# Patient Record
Sex: MALE | Race: WHITE | URBAN - METROPOLITAN AREA
[De-identification: names, ages, dates, MRNs, and addresses within clinical notes are randomized per-mention and may not be internally consistent; named-entity substitution may affect disease eponyms.]

---

## 2017-03-13 ENCOUNTER — OFFICE VISIT (OUTPATIENT)
Dept: FAMILY MEDICINE CLINIC | Age: 27
End: 2017-03-13

## 2017-03-13 VITALS
BODY MASS INDEX: 25.34 KG/M2 | HEART RATE: 62 BPM | HEIGHT: 70 IN | TEMPERATURE: 98 F | DIASTOLIC BLOOD PRESSURE: 70 MMHG | WEIGHT: 177 LBS | SYSTOLIC BLOOD PRESSURE: 124 MMHG | OXYGEN SATURATION: 97 %

## 2017-03-13 DIAGNOSIS — Z00.00 ANNUAL PHYSICAL EXAM: Primary | ICD-10-CM

## 2017-03-13 LAB
BILIRUBIN, POC: NORMAL
BLOOD URINE, POC: NORMAL
CLARITY, POC: CLEAR
COLOR, POC: YELLOW
GLUCOSE URINE, POC: NORMAL
KETONES, POC: NORMAL
LEUKOCYTE EST, POC: NORMAL
NITRITE, POC: NORMAL
PH, POC: 7.5
PROTEIN, POC: NORMAL
SPECIFIC GRAVITY, POC: 1
UROBILINOGEN, POC: 0.2

## 2017-03-13 PROCEDURE — 99395 PREV VISIT EST AGE 18-39: CPT | Performed by: NURSE PRACTITIONER

## 2017-03-13 PROCEDURE — 90746 HEPB VACCINE 3 DOSE ADULT IM: CPT | Performed by: NURSE PRACTITIONER

## 2017-03-13 PROCEDURE — 81002 URINALYSIS NONAUTO W/O SCOPE: CPT | Performed by: NURSE PRACTITIONER

## 2017-03-13 PROCEDURE — 90471 IMMUNIZATION ADMIN: CPT | Performed by: NURSE PRACTITIONER

## 2017-03-14 PROBLEM — Z00.00 ANNUAL PHYSICAL EXAM: Status: ACTIVE | Noted: 2017-03-14

## 2017-04-17 DIAGNOSIS — Z00.00 ANNUAL PHYSICAL EXAM: ICD-10-CM

## 2017-04-17 LAB
CHOLESTEROL, TOTAL: 139 MG/DL (ref 0–199)
GLUCOSE BLD-MCNC: 102 MG/DL (ref 70–99)
HDLC SERPL-MCNC: 48 MG/DL (ref 40–60)
LDL CHOLESTEROL CALCULATED: 76 MG/DL
TRIGL SERPL-MCNC: 74 MG/DL (ref 0–150)
VLDLC SERPL CALC-MCNC: 15 MG/DL

## 2018-09-26 PROBLEM — Z00.00 ANNUAL PHYSICAL EXAM: Status: RESOLVED | Noted: 2017-03-14 | Resolved: 2018-09-26

## 2022-03-03 NOTE — PROGRESS NOTES
3/4/2022    Autumn Shah (:  1990) is a 36 y.o. male, here for evaluation of the following medical concerns:    HPI    Well Adult Physical: Patient here for a comprehensive physical exam.The patient reports problems -inattentiveness, concern for ADD  Do you take any herbs or supplements that were not prescribed by a doctor? no Are you taking calcium supplements? not applicable Are you taking aspirin daily? not applicable    Sexual activity: has sex with females; condoms  Diet: Healthy  Exercise: weight training  4 time(s) per week  Seatbelt use: yes    Review of Systems   Constitutional: Negative for activity change, fatigue and unexpected weight change. HENT: Negative for hearing loss. Eyes: Negative for visual disturbance. Respiratory: Negative for cough and shortness of breath. Cardiovascular: Negative for chest pain and leg swelling. Gastrointestinal: Negative for blood in stool, nausea and vomiting. Endocrine: Negative for cold intolerance, heat intolerance, polydipsia and polyphagia. Genitourinary: Negative for dysuria, frequency, penile discharge, penile pain, scrotal swelling and testicular pain. Musculoskeletal: Negative for arthralgias. Skin: Negative for rash. Allergic/Immunologic: Negative for environmental allergies. Neurological: Negative for dizziness, seizures, light-headedness and headaches. Hematological: Does not bruise/bleed easily. Psychiatric/Behavioral: Positive for decreased concentration. Negative for agitation, dysphoric mood, self-injury, sleep disturbance and suicidal ideas. The patient is not nervous/anxious. Prior to Visit Medications    Not on File        No Known Allergies    History reviewed. No pertinent past medical history.     Past Surgical History:   Procedure Laterality Date    KNEE SURGERY         Social History     Socioeconomic History    Marital status: Single     Spouse name: Not on file    Number of children: Not on file  Years of education: Not on file    Highest education level: Not on file   Occupational History    Not on file   Tobacco Use    Smoking status: Current Every Day Smoker    Smokeless tobacco: Never Used    Tobacco comment: social    Substance and Sexual Activity    Alcohol use: Yes     Comment: drinks once a week    Drug use: No    Sexual activity: Yes     Partners: Female     Birth control/protection: Condom   Other Topics Concern    Not on file   Social History Narrative    Not on file     Social Determinants of Health     Financial Resource Strain:     Difficulty of Paying Living Expenses: Not on file   Food Insecurity:     Worried About Running Out of Food in the Last Year: Not on file    Felix of Food in the Last Year: Not on file   Transportation Needs:     Lack of Transportation (Medical): Not on file    Lack of Transportation (Non-Medical): Not on file   Physical Activity:     Days of Exercise per Week: Not on file    Minutes of Exercise per Session: Not on file   Stress:     Feeling of Stress : Not on file   Social Connections:     Frequency of Communication with Friends and Family: Not on file    Frequency of Social Gatherings with Friends and Family: Not on file    Attends Hoahaoism Services: Not on file    Active Member of [address] [place] [address] or Organizations: Not on file    Attends Club or Organization Meetings: Not on file    Marital Status: Not on file   Intimate Partner Violence:     Fear of Current or Ex-Partner: Not on file    Emotionally Abused: Not on file    Physically Abused: Not on file    Sexually Abused: Not on file   Housing Stability:     Unable to Pay for Housing in the Last Year: Not on file    Number of [place] in the Last Year: Not on file    Unstable Housing in the Last Year: Not on file        History reviewed. No pertinent family history.     Vitals:    03/04/22 0843   BP: 125/71   Pulse: 57   Resp: 16   Temp: 98.4 °F (36.9 °C)   TempSrc: Temporal   Weight: 181 lb 6.4 oz (82.3 kg)   Height: 5' 10\" (1.778 m)     Estimated body mass index is 26.03 kg/m² as calculated from the following:    Height as of this encounter: 5' 10\" (1.778 m). Weight as of this encounter: 181 lb 6.4 oz (82.3 kg). Physical Exam  Vitals reviewed. Constitutional:       Appearance: Normal appearance. He is normal weight. HENT:      Head: Normocephalic and atraumatic. Right Ear: Tympanic membrane, ear canal and external ear normal.      Left Ear: Tympanic membrane, ear canal and external ear normal.      Nose: Nose normal.   Eyes:      Extraocular Movements: Extraocular movements intact. Conjunctiva/sclera: Conjunctivae normal.   Cardiovascular:      Rate and Rhythm: Normal rate and regular rhythm. Pulses: Normal pulses. Heart sounds: Normal heart sounds. Pulmonary:      Effort: Pulmonary effort is normal.      Breath sounds: Normal breath sounds. Abdominal:      General: Abdomen is flat. Bowel sounds are normal.      Palpations: Abdomen is soft. Musculoskeletal:         General: Normal range of motion. Cervical back: Normal range of motion and neck supple. Skin:     General: Skin is warm and dry. Capillary Refill: Capillary refill takes less than 2 seconds. Findings: No rash. Neurological:      General: No focal deficit present. Mental Status: He is alert and oriented to person, place, and time. Mental status is at baseline. Psychiatric:         Mood and Affect: Mood normal.         Behavior: Behavior normal.         Thought Content:  Thought content normal.         Judgment: Judgment normal.         Separate Identifiable issues addressed today:  ADD/ADHD Symptoms:  Patient complains of a several month(s) history of inattention, impulsivity, academic underachievement, including the following: fails to give close attention to details or makes careless mistakes in school, work, or other activities, has difficulty sustaining attention in tasks or play activities, does not seem to listen when spoken to directly, has difficulty organizing tasks and activities, does not follow through on instructions and fails to finish schoolwork, chores, or duties in the workplace, is easily distracted by extraneous stimuli, is often forgetful in daily activities and avoids engaging in tasks that require sustained attention. He presents for evaluation of suspected ADD/ADHD. Patient denies hyperactivity, behavior problems, depressed mood, anhedonia, feelings of hopelessness, anxiety. Symptoms have been rapidly worsening with time. Family history of ADD/ADHD: no.  Previous treatment:has included: none. ASSESSMENT/PLAN:  Gustavo Chase was seen today for establish care. Diagnoses and all orders for this visit:    Encounter to establish care with new doctor: Vitals reviewed within normal limits. BMI nonobese. Reviewed diet exercise regimen patient regimen appropriate lifestyle modifications. -     Comprehensive Metabolic Panel; Future    Episodes of decreased attentiveness: No prior history of ADD. Works as a  and as his career is gotten more than banding he has noticed the symptoms above. Would like referral for evaluation of ADD. Referral placed. -     Ambulatory referral to Psychiatry    Encounter for hepatitis C screening test for low risk patient  -     Hepatitis C Antibody; Future    Screening for HIV (human immunodeficiency virus)  -     HIV Screen; Future    Screening for hyperlipidemia  -     Lipid, Fasting; Future        Return in about 3 months (around 6/4/2022) for ADD/ADHD. An  electronic signature was used to authenticate this note.     --Laurie De Leon, DO on 3/4/2022 at 9:06 AM

## 2022-03-03 NOTE — PATIENT INSTRUCTIONS
Patient Education        Well Visit, Ages 25 to 48: Care Instructions  Overview     Well visits can help you stay healthy. Your doctor has checked your overall health and may have suggested ways to take good care of yourself. Your doctor also may have recommended tests. At home, you can help prevent illness with healthy eating, regular exercise, and other steps. Follow-up care is a key part of your treatment and safety. Be sure to make and go to all appointments, and call your doctor if you are having problems. It's also a good idea to know your test results and keep a list of the medicines you take. How can you care for yourself at home? · Get screening tests that you and your doctor decide on. Screening helps find diseases before any symptoms appear. · Eat healthy foods. Choose fruits, vegetables, whole grains, protein, and low-fat dairy foods. Limit fat, especially saturated fat. Reduce salt in your diet. · Limit alcohol. If you are a man, have no more than 2 drinks a day or 14 drinks a week. If you are a woman, have no more than 1 drink a day or 7 drinks a week. · Get at least 30 minutes of physical activity on most days of the week. Walking is a good choice. You also may want to do other activities, such as running, swimming, cycling, or playing tennis or team sports. Discuss any changes in your exercise program with your doctor. · Reach and stay at a healthy weight. This will lower your risk for many problems, such as obesity, diabetes, heart disease, and high blood pressure. · Do not smoke or allow others to smoke around you. If you need help quitting, talk to your doctor about stop-smoking programs and medicines. These can increase your chances of quitting for good. · Care for your mental health. It is easy to get weighed down by worry and stress. Learn strategies to manage stress, like deep breathing and mindfulness, and stay connected with your family and community.  If you find you often feel sad or hopeless, talk with your doctor. Treatment can help. · Talk to your doctor about whether you have any risk factors for sexually transmitted infections (STIs). You can help prevent STIs if you wait to have sex with a new partner (or partners) until you've each been tested for STIs. It also helps if you use condoms (male or female condoms) and if you limit your sex partners to one person who only has sex with you. Vaccines are available for some STIs, such as HPV. · Use birth control if it's important to you to prevent pregnancy. Talk with your doctor about the choices available and what might be best for you. · If you think you may have a problem with alcohol or drug use, talk to your doctor. This includes prescription medicines (such as amphetamines and opioids) and illegal drugs (such as cocaine and methamphetamine). Your doctor can help you figure out what type of treatment is best for you. · Protect your skin from too much sun. When you're outdoors from 10 a.m. to 4 p.m., stay in the shade or cover up with clothing and a hat with a wide brim. Wear sunglasses that block UV rays. Even when it's cloudy, put broad-spectrum sunscreen (SPF 30 or higher) on any exposed skin. · See a dentist one or two times a year for checkups and to have your teeth cleaned. · Wear a seat belt in the car. When should you call for help? Watch closely for changes in your health, and be sure to contact your doctor if you have any problems or symptoms that concern you. Where can you learn more? Go to [email or web address] org and sign in to your [email or web address] account. Enter P072 in the [employer] box to learn more about \"Well Visit, Ages 25 to 48: Care Instructions. \"     If you do not have an account, please click on the \"Sign Up Now\" link. Current as of: October 6, 2021               Content Version: 13.1  © 6105-4130 Healthwise, Incorporated. Care instructions adapted under license by [facility] ([facility]). If you have questions about a medical condition or this instruction, always ask your healthcare professional. [place] [address] any warranty or liability for your use of this information.

## 2022-03-04 ENCOUNTER — OFFICE VISIT (OUTPATIENT)
Dept: PRIMARY CARE CLINIC | Age: 32
End: 2022-03-04

## 2022-03-04 VITALS
RESPIRATION RATE: 16 BRPM | BODY MASS INDEX: 25.97 KG/M2 | HEART RATE: 57 BPM | HEIGHT: 70 IN | WEIGHT: 181.4 LBS | DIASTOLIC BLOOD PRESSURE: 71 MMHG | TEMPERATURE: 98.4 F | SYSTOLIC BLOOD PRESSURE: 125 MMHG

## 2022-03-04 DIAGNOSIS — Z76.89 ENCOUNTER TO ESTABLISH CARE WITH NEW DOCTOR: ICD-10-CM

## 2022-03-04 DIAGNOSIS — Z11.59 ENCOUNTER FOR HEPATITIS C SCREENING TEST FOR LOW RISK PATIENT: ICD-10-CM

## 2022-03-04 DIAGNOSIS — Z13.220 SCREENING FOR HYPERLIPIDEMIA: ICD-10-CM

## 2022-03-04 DIAGNOSIS — Z76.89 ENCOUNTER TO ESTABLISH CARE WITH NEW DOCTOR: Primary | ICD-10-CM

## 2022-03-04 DIAGNOSIS — R68.89 EPISODES OF DECREASED ATTENTIVENESS: ICD-10-CM

## 2022-03-04 DIAGNOSIS — Z11.4 SCREENING FOR HIV (HUMAN IMMUNODEFICIENCY VIRUS): ICD-10-CM

## 2022-03-04 LAB
A/G RATIO: 2.2 (ref 1.1–2.2)
ALBUMIN SERPL-MCNC: 4.9 G/DL (ref 3.4–5)
ALP BLD-CCNC: 53 U/L (ref 40–129)
ALT SERPL-CCNC: 30 U/L (ref 10–40)
ANION GAP SERPL CALCULATED.3IONS-SCNC: 13 MMOL/L (ref 3–16)
AST SERPL-CCNC: 18 U/L (ref 15–37)
BILIRUB SERPL-MCNC: 0.5 MG/DL (ref 0–1)
BUN BLDV-MCNC: 14 MG/DL (ref 7–20)
CALCIUM SERPL-MCNC: 9.9 MG/DL (ref 8.3–10.6)
CHLORIDE BLD-SCNC: 102 MMOL/L (ref 99–110)
CHOLESTEROL, FASTING: 146 MG/DL (ref 0–199)
CO2: 25 MMOL/L (ref 21–32)
CREAT SERPL-MCNC: 0.9 MG/DL (ref 0.9–1.3)
GFR AFRICAN AMERICAN: >60
GFR NON-AFRICAN AMERICAN: >60
GLUCOSE BLD-MCNC: 91 MG/DL (ref 70–99)
HDLC SERPL-MCNC: 51 MG/DL (ref 40–60)
HEPATITIS C ANTIBODY INTERPRETATION: NORMAL
HIV AG/AB: NORMAL
HIV ANTIGEN: NORMAL
HIV-1 ANTIBODY: NORMAL
HIV-2 AB: NORMAL
LDL CHOLESTEROL CALCULATED: 83 MG/DL
POTASSIUM SERPL-SCNC: 5 MMOL/L (ref 3.5–5.1)
SODIUM BLD-SCNC: 140 MMOL/L (ref 136–145)
TOTAL PROTEIN: 7.1 G/DL (ref 6.4–8.2)
TRIGLYCERIDE, FASTING: 59 MG/DL (ref 0–150)
VLDLC SERPL CALC-MCNC: 12 MG/DL

## 2022-03-04 PROCEDURE — 99204 OFFICE O/P NEW MOD 45 MIN: CPT | Performed by: STUDENT IN AN ORGANIZED HEALTH CARE EDUCATION/TRAINING PROGRAM

## 2022-03-04 PROCEDURE — 99385 PREV VISIT NEW AGE 18-39: CPT | Performed by: STUDENT IN AN ORGANIZED HEALTH CARE EDUCATION/TRAINING PROGRAM

## 2022-03-04 ASSESSMENT — PATIENT HEALTH QUESTIONNAIRE - PHQ9
SUM OF ALL RESPONSES TO PHQ QUESTIONS 1-9: 0
2. FEELING DOWN, DEPRESSED OR HOPELESS: 0
SUM OF ALL RESPONSES TO PHQ9 QUESTIONS 1 & 2: 0
1. LITTLE INTEREST OR PLEASURE IN DOING THINGS: 0
SUM OF ALL RESPONSES TO PHQ QUESTIONS 1-9: 0

## 2022-03-04 ASSESSMENT — ENCOUNTER SYMPTOMS
SHORTNESS OF BREATH: 0
NAUSEA: 0
VOMITING: 0
BLOOD IN STOOL: 0
COUGH: 0

## 2022-03-07 ENCOUNTER — PATIENT MESSAGE (OUTPATIENT)
Dept: PRIMARY CARE CLINIC | Age: 32
End: 2022-03-07

## 2022-03-07 RX ORDER — KETOTIFEN FUMARATE 0.35 MG/ML
1 SOLUTION/ DROPS OPHTHALMIC 2 TIMES DAILY
Qty: 10 ML | Refills: 2 | Status: SHIPPED | OUTPATIENT
Start: 2022-03-07 | End: 2022-03-17

## 2022-04-15 ENCOUNTER — OFFICE VISIT (OUTPATIENT)
Dept: PSYCHIATRY | Age: 32
End: 2022-04-15

## 2022-04-15 DIAGNOSIS — F90.0 ADHD, PREDOMINANTLY INATTENTIVE TYPE: Primary | ICD-10-CM

## 2022-04-15 PROCEDURE — 99204 OFFICE O/P NEW MOD 45 MIN: CPT | Performed by: STUDENT IN AN ORGANIZED HEALTH CARE EDUCATION/TRAINING PROGRAM

## 2022-04-15 RX ORDER — DEXTROAMPHETAMINE SACCHARATE, AMPHETAMINE ASPARTATE MONOHYDRATE, DEXTROAMPHETAMINE SULFATE AND AMPHETAMINE SULFATE 1.25; 1.25; 1.25; 1.25 MG/1; MG/1; MG/1; MG/1
5 CAPSULE, EXTENDED RELEASE ORAL DAILY
Qty: 30 CAPSULE | Refills: 0 | Status: SHIPPED | OUTPATIENT
Start: 2022-04-15 | End: 2022-05-23 | Stop reason: SDUPTHER

## 2022-04-15 ASSESSMENT — PATIENT HEALTH QUESTIONNAIRE - PHQ9
SUM OF ALL RESPONSES TO PHQ QUESTIONS 1-9: 0
SUM OF ALL RESPONSES TO PHQ9 QUESTIONS 1 & 2: 0
SUM OF ALL RESPONSES TO PHQ QUESTIONS 1-9: 0
SUM OF ALL RESPONSES TO PHQ QUESTIONS 1-9: 0
2. FEELING DOWN, DEPRESSED OR HOPELESS: 0
4. FEELING TIRED OR HAVING LITTLE ENERGY: 0
7. TROUBLE CONCENTRATING ON THINGS, SUCH AS READING THE NEWSPAPER OR WATCHING TELEVISION: 0
6. FEELING BAD ABOUT YOURSELF - OR THAT YOU ARE A FAILURE OR HAVE LET YOURSELF OR YOUR FAMILY DOWN: 0
8. MOVING OR SPEAKING SO SLOWLY THAT OTHER PEOPLE COULD HAVE NOTICED. OR THE OPPOSITE, BEING SO FIGETY OR RESTLESS THAT YOU HAVE BEEN MOVING AROUND A LOT MORE THAN USUAL: 0
5. POOR APPETITE OR OVEREATING: 0
SUM OF ALL RESPONSES TO PHQ QUESTIONS 1-9: 0
9. THOUGHTS THAT YOU WOULD BE BETTER OFF DEAD, OR OF HURTING YOURSELF: 0
1. LITTLE INTEREST OR PLEASURE IN DOING THINGS: 0
3. TROUBLE FALLING OR STAYING ASLEEP: 0
10. IF YOU CHECKED OFF ANY PROBLEMS, HOW DIFFICULT HAVE THESE PROBLEMS MADE IT FOR YOU TO DO YOUR WORK, TAKE CARE OF THINGS AT HOME, OR GET ALONG WITH OTHER PEOPLE: 0

## 2022-04-15 ASSESSMENT — ANXIETY QUESTIONNAIRES
2. NOT BEING ABLE TO STOP OR CONTROL WORRYING: 0
3. WORRYING TOO MUCH ABOUT DIFFERENT THINGS: 0
5. BEING SO RESTLESS THAT IT IS HARD TO SIT STILL: 0
4. TROUBLE RELAXING: 0
6. BECOMING EASILY ANNOYED OR IRRITABLE: 0
GAD7 TOTAL SCORE: 0
1. FEELING NERVOUS, ANXIOUS, OR ON EDGE: 0
7. FEELING AFRAID AS IF SOMETHING AWFUL MIGHT HAPPEN: 0
IF YOU CHECKED OFF ANY PROBLEMS ON THIS QUESTIONNAIRE, HOW DIFFICULT HAVE THESE PROBLEMS MADE IT FOR YOU TO DO YOUR WORK, TAKE CARE OF THINGS AT HOME, OR GET ALONG WITH OTHER PEOPLE: NOT DIFFICULT AT ALL

## 2022-04-15 NOTE — PROGRESS NOTES
PSYCHIATRY INITIAL EVALUATION    Chyna Willard  1990  04/15/22  Face to Face time: 60 minutes  PCP: Elia Wilkes DO    CC: ADHD      ASSESSMENT:   Patient is a 65-year-old male without significant past medical history presents to the outpatient psychiatric clinic today for evaluation management of ADHD. Patient presentation today is consistent with prominent difficulties stemming from a diagnosis of ADHD, inattentive subtype predominant (though meets criteria for combined type). While the patient has always been successful and gotten good grades, this is likely due to a high degree of innate intelligence that has allowed him to cope well with his underlying difficulties. In his current situation, the patient is starting to experience difficulties across multiple realms and seeing his coping skills being inadequate for his needs. We will attempt to address this with medication management. Patient denies significant symptoms in depression or anxiety, though these are not uncommon in the setting of difficulties with ADHD. We will continue to monitor these as we go forward with treatment. Diagnoses:  ADHD, inattentive subtype predominant    PLAN:   1. Discussed with patient potential management options further conditions including medication management as well as nonpharmacologic strategies. Patient on board with current plan of care. 2.  We will attempt to initiate treatment for ADHD with Adderall XR 5 mg daily. Patient was cautioned regarding adverse effects of this medication including appetite suppression, headaches, increases to heart rate/blood pressure, and yamileth/psychosis.       Medication Monitoring:    - PDMP reviewed: No matching criteria      Follow-up: RTC in 4 weeks    Safety: Pt was counseled on the potential for increased suicidal ideations and advised on potential options for dealing with these including hotlines, calling the office, or going to the nearest emergency room.    ____________________________________________________________________________    HPI:   Patient is a 28-year-old male without significant past medical history presents today to the outpatient psychiatric clinic today for evaluation and management of ADHD. Patient indicated that he has felt that he has had ADHD for a very long time but has always been able to deal with it. He acknowledged current difficulties with losing track of conversations, time management issues, and prominent decrease to focus. Patient acknowledged that he will often lose sleep secondary to doing tasks at the last minute. He recognizes that this has been a continuing pattern for him, that he was the class clown in school because he \"could not stop talking\". Patient feels that his behavior is holding him back from significant advancement in his field, as he has been criticized for \"not listening to the concerns/advice of others\". Given this, an ADHD screening was conducted which revealed the followin. Inattention: Five or more for adolescents 16 and older and adults; symptoms of inattention have been present for at least 6 months, and they are inappropriate for developmental level (positives in bold):    Often fails to give close attention to details or makes careless mistakes in schoolwork, at work, or with other activities.  Often has trouble holding attention on tasks or play activities.  Often does not seem to listen when spoken to directly.  Often does not follow through on instructions and fails to finish schoolwork, chores, or duties in the workplace (e.g., loses focus, side-tracked).  Often has trouble organizing tasks and activities.  Often avoids, dislikes, or is reluctant to do tasks that require mental effort over a long period of time (such as schoolwork or homework).     Often loses things necessary for tasks and activities (e.g. school materials, pencils, books, tools, wallets, keys, paperwork, eyeglasses, mobile telephones).  Is often easily distracted    Is often forgetful in daily activities. 2. Hyperactivity and Impulsivity: Five or more for adolescents 17 and older and adults; symptoms of hyperactivity-impulsivity have been present for at least 6 months to an extent that is disruptive and inappropriate for the persons developmental level (positives in bold):    Often fidgets with or taps hands or feet, or squirms in seat.  Often leaves seat in situations when remaining seated is expected.  Often runs about or climbs in situations where it is not appropriate (adolescents or adults may be limited to feeling restless).  Often unable to play or take part in leisure activities quietly.  Is often \"on the go\" acting as if \"driven by a motor\".  Often talks excessively.  Often blurts out an answer before a question has been completed.  Often has trouble waiting his/her turn.  Often interrupts or intrudes on others (e.g., butts into conversations or games)    In addition, patient endorses (positives in bold):  Several inattentive or hyperactive-impulsive symptoms were present before age 15 years.  Several symptoms are present in two or more setting, (e.g., at home, school or work; with friends or relatives; in other activities).  There is clear evidence that the symptoms interfere with, or reduce the quality of, social, school, or work functioning. On depression screening, patient identified achieving approximately 6 hours of sleep on average, though he noted decreased sleep at times when he has projects to do at the last minute. He notes that his appetite is okay, that he achieves good exercise. He notes his motivation and energy are generally okay, except that his motivation is only usually there to get tasks done when the deadline approaches. He acknowledged prominent decreased focus as described previously.   He denies significant depressive moods, denies SI/HI. Patient screened negative for yamileth/psychosis. He denied significant symptoms of anxiety other than the fact that he gets an anxious sensation when tasks begin to build up. Patient denied a history consistent with panic or trauma. ROS:   Review of Systems   Constitutional: Negative. HENT: Negative. Eyes: Negative. Respiratory: Negative. Cardiovascular: Negative. Gastrointestinal: Negative. Endocrine: Negative. Genitourinary: Negative. Musculoskeletal: Negative. Skin: Negative. Allergic/Immunologic: Negative. Neurological: Negative. Hematological: Negative. Psychiatric/Behavioral: Positive for behavioral problems and decreased concentration. Past Psychiatric History:     Hosp: Denied  Diagnoses: None prior  Currrent medications: No current medications  Med trials: Denied  Outpt: None prior  NSSI: Denied  Suicide Attempts: Denied    No past medical history on file. Past Surgical History:   Procedure Laterality Date    KNEE SURGERY       Social History     Socioeconomic History    Marital status: Single     Spouse name: None    Number of children: None    Years of education: None    Highest education level: Bachelor's degree (e.g., BA, AB, BS)   Occupational History    Occupation: [employer]    Tobacco Use    Smoking status: Current Every Day Smoker    Smokeless tobacco: Never Used    Tobacco comment: social    Substance and Sexual Activity    Alcohol use: Yes     Comment: drinks once a week    Drug use: Yes     Types: Marijuana (Weed)     Comment: Recreational 2-3 times per week    Sexual activity: Yes     Partners: Female     Birth control/protection: Condom   Other Topics Concern    None   Social History Narrative    Patient acknowledges that he and his girlfriend just recently moved in together, noting that this has increased some stress in his life.   They have been together for slightly under 9 months at this time. They have 3 dogs together. Patient acknowledges that he is always gotten good grades throughout school, though he was noted to be a \"class clown\". He graduated with a degree in biomedical engineering. No guns in the home, no  service for the patient, no legal issues at this time. Social Determinants of Health     Financial Resource Strain:     Difficulty of Paying Living Expenses: Not on file   Food Insecurity:     Worried About Running Out of Food in the Last Year: Not on file    Felix of Food in the Last Year: Not on file   Transportation Needs:     Lack of Transportation (Medical): Not on file    Lack of Transportation (Non-Medical): Not on file   Physical Activity:     Days of Exercise per Week: Not on file    Minutes of Exercise per Session: Not on file   Stress:     Feeling of Stress : Not on file   Social Connections:     Frequency of Communication with Friends and Family: Not on file    Frequency of Social Gatherings with Friends and Family: Not on file    Attends Pentecostal Services: Not on file    Active Member of [address] [place] [address] [employer] or Organizations: Not on file    Attends Club or Organization Meetings: Not on file    Marital Status: Not on file   Intimate Partner Violence:     Fear of Current or Ex-Partner: Not on file    Emotionally Abused: Not on file    Physically Abused: Not on file    Sexually Abused: Not on file   Housing Stability:     Unable to Pay for Housing in the Last Year: Not on file    Number of [place] in the Last Year: Not on file    Unstable Housing in the Last Year: Not on file      No family history on file. No Known Allergies  No current outpatient medications on file prior to visit. No current facility-administered medications on file prior to visit. OBJECTIVE:  Vitals:    04/15/22 1404   BP: 130/78   Pulse: 69   Resp: 12   Weight: 180 lb 9.6 oz (81.9 kg)       MSE:   Appearance:    Appropriately dressed  Motor: Fidgeting noted.   Eye Contact: Appropriate  Speech:    Appropriate rate and rhythm  Language:   Appropriate diction  Mood/Affect:   \"My mood is okay\"/euthymic  Thought Process: Tangential multiple times with episodic linear/logical thought  Thought Content:    Topicappropriate, no SI/HI  Hallucinations:   Denied, not seem to be responding to internal stimuli  Associations:   Intact  Attention/Concentration:   Inattention errors noted throughout conversation  Orientation:    Alert and oriented x4  Memory:   Intact  Fund of Knowledge:    Appropriate for age and education  Insight/Judgement:   Intact/intact    MARTHA-7 SCREENING 4/15/2022   Feeling nervous, anxious, or on edge Not at all   Not being able to stop or control worrying Not at all   Worrying too much about different things Not at all   Trouble relaxing Not at all   Being so restless that it is hard to sit still Not at all   Becoming easily annoyed or irritable Not at all   Feeling afraid as if something awful might happen Not at all   MARTHA-7 Total Score 0   How difficult have these problems made it for you to do your work, take care of things at home, or get along with other people? Not difficult at all     PHQ-9 Questionaire 4/15/2022 3/4/2022   Little interest or pleasure in doing things 0 0   Feeling down, depressed, or hopeless 0 0   Trouble falling or staying asleep, or sleeping too much 0 -   Feeling tired or having little energy 0 -   Poor appetite or overeating 0 -   Feeling bad about yourself - or that you are a failure or have let yourself or your family down 0 -   Trouble concentrating on things, such as reading the newspaper or watching television 0 -   Moving or speaking so slowly that other people could have noticed.  Or the opposite - being so fidgety or restless that you have been moving around a lot more than usual 0 -   Thoughts that you would be better off dead, or of hurting yourself in some way 0 -   PHQ-9 Total Score 0 0   If you checked off any problems, how difficult have these problems made it for you to do your work, take care of things at home, or get along with other people?  0 -        Labs:     Lab Results   Component Value Date    CHOL 139 04/17/2017     Lab Results   Component Value Date    TRIG 74 04/17/2017     Lab Results   Component Value Date    HDL 51 03/04/2022    HDL 48 04/17/2017     Lab Results   Component Value Date    LDLCALC 83 03/04/2022    LDLCALC 76 04/17/2017     Lab Results   Component Value Date    LABVLDL 12 03/04/2022    LABVLDL 15 04/17/2017     TSH: None on file    Last Drug screen: None on file    Imaging: No pertinent imaging    EKG: None on file        Olivia Ann MD   Psychiatry

## 2022-05-01 VITALS
RESPIRATION RATE: 12 BRPM | HEART RATE: 69 BPM | DIASTOLIC BLOOD PRESSURE: 78 MMHG | BODY MASS INDEX: 25.91 KG/M2 | WEIGHT: 180.6 LBS | SYSTOLIC BLOOD PRESSURE: 130 MMHG

## 2022-05-03 ASSESSMENT — ENCOUNTER SYMPTOMS
ALLERGIC/IMMUNOLOGIC NEGATIVE: 1
GASTROINTESTINAL NEGATIVE: 1
RESPIRATORY NEGATIVE: 1
EYES NEGATIVE: 1

## 2022-06-10 PROBLEM — F90.0 ATTENTION DEFICIT HYPERACTIVITY DISORDER (ADHD), PREDOMINANTLY INATTENTIVE TYPE: Status: ACTIVE | Noted: 2022-06-10

## 2022-12-27 NOTE — PROGRESS NOTES
PSYCHIATRY PROGRESS NOTE    Jose Minors  1990 12/30/22  Face to Face time: 30 minutes  PCP: Lauren Montgomery DO    CC:   Chief Complaint   Patient presents with    ADHD     Patient is a 51-year-old male without significant past medical history presents to the outpatient psychiatric clinic today for evaluation management of ADHD. A:  Patient's presentation today is indicative of continued difficulties with ADHD that is largely due to not being on his ADHD medications for >6mo. Diagnoses:  ADHD, inattentive subtype predominant    P:   We will plan to restart the patient's Adderall XR 5mg daily    Medication Monitoring:    - PDMP reviewed: No current prescriptions    Follow-up: 1 month    Safety: Pt was counseled on the potential for increased suicidal ideations and advised on potential options for dealing with these including hotlines, calling the office, or going to the nearest emergency room. __________________________________________________________________________    S:   Patient indicated that he had a tough year with his girlfriend. She has had a lot of medical issues that have needed attention, and he has done his best to put her before his own needs. He stated that he felt that coming back and addressing his ADHD needed to be done at this time because he is having ongoing difficulties with procrastination, starting tasks and not finishing them, and being overly stressed about such things at work. He also noted that he is not able to focus as much about getting out to the gym and maintaining his own health. He did note that when he was on the Adderall that this combination of symptoms was markedly improved. He denied current problems with depression or anxiety, only stating that his concentration remains impaired as stated. Patient denied any SI/HI/AVH on evaluation today. ROS:  Review of Systems   Constitutional: Negative. HENT: Negative. Eyes: Negative. Respiratory: Negative. Cardiovascular: Negative. Gastrointestinal: Negative. Endocrine: Negative. Genitourinary: Negative. Musculoskeletal: Negative. Skin: Negative. Allergic/Immunologic: Negative. Neurological: Negative. Hematological: Negative. Psychiatric/Behavioral:  Positive for decreased concentration. Brief Medical Hx:   Patient Active Problem List   Diagnosis    Attention deficit hyperactivity disorder (ADHD), predominantly inattentive type        Brief Psych Hx:  Hosp: Denied  Diagnoses: None prior  Med trials: Denied  Outpt: None prior  NSSI: Denied  Suicide Attempts: Denied    O:  Wt Readings from Last 3 Encounters:   12/30/22 184 lb 9.6 oz (83.7 kg)   04/15/22 180 lb 9.6 oz (81.9 kg)   03/04/22 181 lb 6.4 oz (82.3 kg)       Vitals:    12/30/22 1359   BP: 131/79   Pulse: 69   Weight: 184 lb 9.6 oz (83.7 kg)       Mental Status Exam:   Appearance:    Appropriately dressed  Motor: No abnormal movements, tics or mannerisms. Eye Contact: Good  Speech:    Appropriate rate and rhythm  Language:   Appropriate diction  Mood/Affect:  \" Pretty good\"/euthymic  Thought Process:   Linear, logical  Thought Content:    Topic-appropriate, no SI/HI  Hallucinations:   Denied, not seem to be responding to internal stimuli  Associations:   Intact  Attention/Concentration:   Concentration errors noted on testing.   Days of the week forward and backward both had a day missing from them  Orientation:    Alert and oriented x4  Memory:   Intact  Fund of Knowledge:    Appropriate for age and education  Insight/Judgement:   Intact/intact      PHQ-9 Questionaire 12/30/2022 4/15/2022   Little interest or pleasure in doing things 0 0   Feeling down, depressed, or hopeless 0 0   Trouble falling or staying asleep, or sleeping too much 0 0   Feeling tired or having little energy 0 0   Poor appetite or overeating 0 0   Feeling bad about yourself - or that you are a failure or have let yourself or your family down 0 0 Trouble concentrating on things, such as reading the newspaper or watching television 0 0   Moving or speaking so slowly that other people could have noticed. Or the opposite - being so fidgety or restless that you have been moving around a lot more than usual 0 0   Thoughts that you would be better off dead, or of hurting yourself in some way 0 0   PHQ-9 Total Score 0 0   If you checked off any problems, how difficult have these problems made it for you to do your work, take care of things at home, or get along with other people? 0 0     MARTHA-7 SCREENING 12/30/2022 4/15/2022   Feeling nervous, anxious, or on edge Not at all Not at all   Not being able to stop or control worrying Not at all Not at all   Worrying too much about different things Not at all Not at all   Trouble relaxing Not at all Not at all   Being so restless that it is hard to sit still Not at all Not at all   Becoming easily annoyed or irritable Not at all Not at all   Feeling afraid as if something awful might happen Not at all Not at all   MARTHA-7 Total Score 0 0   How difficult have these problems made it for you to do your work, take care of things at home, or get along with other people?  Not difficult at all Not difficult at all      Labs:     Orders Only on 03/04/2022   Component Date Value Ref Range Status    Cholesterol, Fasting 03/04/2022 146  0 - 199 mg/dL Final    Triglyceride, Fasting 03/04/2022 59  0 - 150 mg/dL Final    HDL 03/04/2022 51  40 - 60 mg/dL Final    LDL Calculated 03/04/2022 83  <100 mg/dL Final    VLDL Cholesterol Calculated 03/04/2022 12  Not Established mg/dL Final    Sodium 03/04/2022 140  136 - 145 mmol/L Final    Potassium 03/04/2022 5.0  3.5 - 5.1 mmol/L Final    Chloride 03/04/2022 102  99 - 110 mmol/L Final    CO2 03/04/2022 25  21 - 32 mmol/L Final    Anion Gap 03/04/2022 13  3 - 16 Final    Glucose 03/04/2022 91  70 - 99 mg/dL Final    BUN 03/04/2022 14  7 - 20 mg/dL Final    Creatinine 03/04/2022 0.9  0.9 - 1.3 mg/dL Final    GFR Non- 03/04/2022 >60  >60 Final    Comment: >60 mL/min/1.73m2 EGFR, calc. for ages 25 and older using the  MDRD formula (not corrected for weight), is valid for stable  renal function. GFR  03/04/2022 >60  >60 Final    Comment: Chronic Kidney Disease: less than 60 ml/min/1.73 sq.m. Kidney Failure: less than 15 ml/min/1.73 sq.m. Results valid for patients 18 years and older.       Calcium 03/04/2022 9.9  8.3 - 10.6 mg/dL Final    Total Protein 03/04/2022 7.1  6.4 - 8.2 g/dL Final    Albumin 03/04/2022 4.9  3.4 - 5.0 g/dL Final    Albumin/Globulin Ratio 03/04/2022 2.2  1.1 - 2.2 Final    Total Bilirubin 03/04/2022 0.5  0.0 - 1.0 mg/dL Final    Alkaline Phosphatase 03/04/2022 53  40 - 129 U/L Final    ALT 03/04/2022 30  10 - 40 U/L Final    AST 03/04/2022 18  15 - 37 U/L Final    HIV Ag/Ab 03/04/2022 Non-Reactive  Non-reactive Final    HIV-1 Antibody 03/04/2022 Non-Reactive  Non-reactive Final    HIV ANTIGEN 03/04/2022 Non-Reactive  Non-reactive Final    HIV-2 Ab 03/04/2022 Non-Reactive  Non-reactive Final    Hep C Ab Interp 03/04/2022 Non-reactive  Non-reactive Final       EKG: None on file        Radha Santo MD  Psychiatrist

## 2022-12-30 ENCOUNTER — OFFICE VISIT (OUTPATIENT)
Dept: PSYCHIATRY | Age: 32
End: 2022-12-30

## 2022-12-30 VITALS
DIASTOLIC BLOOD PRESSURE: 79 MMHG | SYSTOLIC BLOOD PRESSURE: 131 MMHG | HEART RATE: 69 BPM | BODY MASS INDEX: 26.49 KG/M2 | WEIGHT: 184.6 LBS

## 2022-12-30 DIAGNOSIS — F90.0 ADHD, PREDOMINANTLY INATTENTIVE TYPE: Primary | ICD-10-CM

## 2022-12-30 PROCEDURE — 99214 OFFICE O/P EST MOD 30 MIN: CPT | Performed by: STUDENT IN AN ORGANIZED HEALTH CARE EDUCATION/TRAINING PROGRAM

## 2022-12-30 RX ORDER — DEXTROAMPHETAMINE SACCHARATE, AMPHETAMINE ASPARTATE MONOHYDRATE, DEXTROAMPHETAMINE SULFATE AND AMPHETAMINE SULFATE 1.25; 1.25; 1.25; 1.25 MG/1; MG/1; MG/1; MG/1
5 CAPSULE, EXTENDED RELEASE ORAL DAILY
Qty: 30 CAPSULE | Refills: 0 | Status: SHIPPED | OUTPATIENT
Start: 2022-12-30 | End: 2023-01-29

## 2022-12-30 ASSESSMENT — ENCOUNTER SYMPTOMS
GASTROINTESTINAL NEGATIVE: 1
ALLERGIC/IMMUNOLOGIC NEGATIVE: 1
RESPIRATORY NEGATIVE: 1
EYES NEGATIVE: 1

## 2022-12-30 ASSESSMENT — PATIENT HEALTH QUESTIONNAIRE - PHQ9
10. IF YOU CHECKED OFF ANY PROBLEMS, HOW DIFFICULT HAVE THESE PROBLEMS MADE IT FOR YOU TO DO YOUR WORK, TAKE CARE OF THINGS AT HOME, OR GET ALONG WITH OTHER PEOPLE: 0
8. MOVING OR SPEAKING SO SLOWLY THAT OTHER PEOPLE COULD HAVE NOTICED. OR THE OPPOSITE, BEING SO FIGETY OR RESTLESS THAT YOU HAVE BEEN MOVING AROUND A LOT MORE THAN USUAL: 0
6. FEELING BAD ABOUT YOURSELF - OR THAT YOU ARE A FAILURE OR HAVE LET YOURSELF OR YOUR FAMILY DOWN: 0
5. POOR APPETITE OR OVEREATING: 0
3. TROUBLE FALLING OR STAYING ASLEEP: 0
4. FEELING TIRED OR HAVING LITTLE ENERGY: 0
SUM OF ALL RESPONSES TO PHQ9 QUESTIONS 1 & 2: 0
2. FEELING DOWN, DEPRESSED OR HOPELESS: 0
SUM OF ALL RESPONSES TO PHQ QUESTIONS 1-9: 0
7. TROUBLE CONCENTRATING ON THINGS, SUCH AS READING THE NEWSPAPER OR WATCHING TELEVISION: 0
9. THOUGHTS THAT YOU WOULD BE BETTER OFF DEAD, OR OF HURTING YOURSELF: 0
1. LITTLE INTEREST OR PLEASURE IN DOING THINGS: 0
SUM OF ALL RESPONSES TO PHQ QUESTIONS 1-9: 0

## 2022-12-30 ASSESSMENT — ANXIETY QUESTIONNAIRES
3. WORRYING TOO MUCH ABOUT DIFFERENT THINGS: 0
5. BEING SO RESTLESS THAT IT IS HARD TO SIT STILL: 0
1. FEELING NERVOUS, ANXIOUS, OR ON EDGE: 0
IF YOU CHECKED OFF ANY PROBLEMS ON THIS QUESTIONNAIRE, HOW DIFFICULT HAVE THESE PROBLEMS MADE IT FOR YOU TO DO YOUR WORK, TAKE CARE OF THINGS AT HOME, OR GET ALONG WITH OTHER PEOPLE: NOT DIFFICULT AT ALL
7. FEELING AFRAID AS IF SOMETHING AWFUL MIGHT HAPPEN: 0
6. BECOMING EASILY ANNOYED OR IRRITABLE: 0
2. NOT BEING ABLE TO STOP OR CONTROL WORRYING: 0
GAD7 TOTAL SCORE: 0
4. TROUBLE RELAXING: 0

## 2023-01-25 NOTE — PROGRESS NOTES
PSYCHIATRY PROGRESS NOTE    Rahul Crocker  1990 01/27/23  Face to Face time: 30 minutes  PCP: Khoi Diaz DO    CC:   Chief Complaint   Patient presents with    Follow-up    ADHD     Patient is a 73-year-old male without significant past medical history presents to the outpatient psychiatric clinic today for evaluation management of ADHD. A:  Patient's presentation today is indicative of improvement of his ADHD with restarting his medication. The dosage remains low and effective in helping him deal with things at home and at work. The patient presents as stable for transfer back to PCP management. Diagnoses:  ADHD, inattentive subtype predominant    P:   No changes. Adderall XR 5mg daily to be continued  Plan to transfer his care back to Dr. Kellie Drake for further prescriptions. Medication Monitoring:    - PDMP reviewed: Adderall XR 5 mg daily 30-day supply last filled 12/30/2022. Follow-up: As needed in the future    Safety: Pt was counseled on the potential for increased suicidal ideations and advised on potential options for dealing with these including hotlines, calling the office, or going to the nearest emergency room. __________________________________________________________________________    S:   Patient indicated that he was doing well, working on digging himself out of a work hole that he was in. He feels that his medications are helpful for him, noting that he's better able to remember details, consequently being better able to keep up and stay on track. He noted he will sometimes not take the medication on weekends, uses it when he feels he needs to do things. The patient has restarted his exercise routine and feels better for this. Lots of stress in the household at the moment with his significant other still not having a surgery date.   He struggles at times with understanding how best to help her, though it's notable that she doesn't know how best to help her in those moments either. It does appear they are communicating well on this issue and trying to make things work better. The patient denied any SI/HI/AVH. ROS:  Review of Systems   Constitutional: Negative. HENT: Negative. Eyes: Negative. Respiratory: Negative. Cardiovascular: Negative. Gastrointestinal: Negative. Endocrine: Negative. Genitourinary: Negative. Musculoskeletal: Negative. Skin: Negative. Allergic/Immunologic: Negative. Neurological: Negative. Hematological: Negative. Psychiatric/Behavioral: Negative. Brief Medical Hx:   Patient Active Problem List   Diagnosis    Attention deficit hyperactivity disorder (ADHD), predominantly inattentive type        Brief Psych Hx:  Hosp: Denied  Diagnoses: None prior  Med trials: Denied  Outpt: None prior  NSSI: Denied  Suicide Attempts: Denied    O:  Wt Readings from Last 3 Encounters:   01/27/23 182 lb 9.6 oz (82.8 kg)   12/30/22 184 lb 9.6 oz (83.7 kg)   04/15/22 180 lb 9.6 oz (81.9 kg)       Vitals:    01/27/23 1235   BP: (!) 142/80   Pulse: 52   Weight: 182 lb 9.6 oz (82.8 kg)       Mental Status Exam:   Appearance:    Appropriately dressed  Motor: No abnormal movements, tics or mannerisms.   Eye Contact: Good  Speech:    Appropriate rate and rhythm  Language:   Appropriate diction  Mood/Affect:  \"I'm good\"/ Euthymic  Thought Process:   Linear, logical  Thought Content:    Topic-appropriate, no SI/HI  Hallucinations:   Denied, not seen to be responding to IS  Associations:   Intact  Attention/Concentration:   Intact  Orientation:    Alert and oriented x4  Memory:   Intact  Fund of Knowledge:    Appropriate for age and education  Insight/Judgement:   Intact/intact      PHQ-9 Questionaire 1/27/2023 12/30/2022   Little interest or pleasure in doing things 0 0   Feeling down, depressed, or hopeless 0 0   Trouble falling or staying asleep, or sleeping too much 0 0   Feeling tired or having little energy 0 0   Poor appetite or overeating 0 0   Feeling bad about yourself - or that you are a failure or have let yourself or your family down 0 0   Trouble concentrating on things, such as reading the newspaper or watching television 0 0   Moving or speaking so slowly that other people could have noticed. Or the opposite - being so fidgety or restless that you have been moving around a lot more than usual 0 0   Thoughts that you would be better off dead, or of hurting yourself in some way 0 0   PHQ-9 Total Score 0 0   If you checked off any problems, how difficult have these problems made it for you to do your work, take care of things at home, or get along with other people? 0 0     MARTHA-7 SCREENING 1/27/2023 12/30/2022   Feeling nervous, anxious, or on edge Not at all Not at all   Not being able to stop or control worrying Not at all Not at all   Worrying too much about different things Not at all Not at all   Trouble relaxing Not at all Not at all   Being so restless that it is hard to sit still Not at all Not at all   Becoming easily annoyed or irritable Not at all Not at all   Feeling afraid as if something awful might happen Not at all Not at all   MARTHA-7 Total Score 0 0   How difficult have these problems made it for you to do your work, take care of things at home, or get along with other people?  Not difficult at all Not difficult at all      Labs:     Orders Only on 03/04/2022   Component Date Value Ref Range Status    Cholesterol, Fasting 03/04/2022 146  0 - 199 mg/dL Final    Triglyceride, Fasting 03/04/2022 59  0 - 150 mg/dL Final    HDL 03/04/2022 51  40 - 60 mg/dL Final    LDL Calculated 03/04/2022 83  <100 mg/dL Final    VLDL Cholesterol Calculated 03/04/2022 12  Not Established mg/dL Final    Sodium 03/04/2022 140  136 - 145 mmol/L Final    Potassium 03/04/2022 5.0  3.5 - 5.1 mmol/L Final    Chloride 03/04/2022 102  99 - 110 mmol/L Final    CO2 03/04/2022 25  21 - 32 mmol/L Final    Anion Gap 03/04/2022 13  3 - 16 Final    Glucose 03/04/2022 91  70 - 99 mg/dL Final    BUN 03/04/2022 14  7 - 20 mg/dL Final    Creatinine 03/04/2022 0.9  0.9 - 1.3 mg/dL Final    GFR Non- 03/04/2022 >60  >60 Final    Comment: >60 mL/min/1.73m2 EGFR, calc. for ages 25 and older using the  MDRD formula (not corrected for weight), is valid for stable  renal function. GFR  03/04/2022 >60  >60 Final    Comment: Chronic Kidney Disease: less than 60 ml/min/1.73 sq.m. Kidney Failure: less than 15 ml/min/1.73 sq.m. Results valid for patients 18 years and older.       Calcium 03/04/2022 9.9  8.3 - 10.6 mg/dL Final    Total Protein 03/04/2022 7.1  6.4 - 8.2 g/dL Final    Albumin 03/04/2022 4.9  3.4 - 5.0 g/dL Final    Albumin/Globulin Ratio 03/04/2022 2.2  1.1 - 2.2 Final    Total Bilirubin 03/04/2022 0.5  0.0 - 1.0 mg/dL Final    Alkaline Phosphatase 03/04/2022 53  40 - 129 U/L Final    ALT 03/04/2022 30  10 - 40 U/L Final    AST 03/04/2022 18  15 - 37 U/L Final    HIV Ag/Ab 03/04/2022 Non-Reactive  Non-reactive Final    HIV-1 Antibody 03/04/2022 Non-Reactive  Non-reactive Final    HIV ANTIGEN 03/04/2022 Non-Reactive  Non-reactive Final    HIV-2 Ab 03/04/2022 Non-Reactive  Non-reactive Final    Hep C Ab Interp 03/04/2022 Non-reactive  Non-reactive Final       EKG: None on file        Elba Watkins MD  Psychiatrist

## 2023-01-27 ENCOUNTER — OFFICE VISIT (OUTPATIENT)
Dept: PSYCHIATRY | Age: 33
End: 2023-01-27

## 2023-01-27 VITALS
DIASTOLIC BLOOD PRESSURE: 80 MMHG | BODY MASS INDEX: 26.2 KG/M2 | WEIGHT: 182.6 LBS | SYSTOLIC BLOOD PRESSURE: 142 MMHG | HEART RATE: 52 BPM

## 2023-01-27 DIAGNOSIS — F90.0 ADHD, PREDOMINANTLY INATTENTIVE TYPE: ICD-10-CM

## 2023-01-27 PROCEDURE — 99214 OFFICE O/P EST MOD 30 MIN: CPT | Performed by: STUDENT IN AN ORGANIZED HEALTH CARE EDUCATION/TRAINING PROGRAM

## 2023-01-27 RX ORDER — DEXTROAMPHETAMINE SACCHARATE, AMPHETAMINE ASPARTATE MONOHYDRATE, DEXTROAMPHETAMINE SULFATE AND AMPHETAMINE SULFATE 1.25; 1.25; 1.25; 1.25 MG/1; MG/1; MG/1; MG/1
5 CAPSULE, EXTENDED RELEASE ORAL DAILY
Qty: 30 CAPSULE | Refills: 0 | Status: SHIPPED | OUTPATIENT
Start: 2023-01-27 | End: 2023-02-26

## 2023-01-27 RX ORDER — DEXTROAMPHETAMINE SACCHARATE, AMPHETAMINE ASPARTATE MONOHYDRATE, DEXTROAMPHETAMINE SULFATE AND AMPHETAMINE SULFATE 1.25; 1.25; 1.25; 1.25 MG/1; MG/1; MG/1; MG/1
5 CAPSULE, EXTENDED RELEASE ORAL DAILY
Qty: 30 CAPSULE | Refills: 0 | Status: SHIPPED | OUTPATIENT
Start: 2023-03-24 | End: 2023-04-23

## 2023-01-27 RX ORDER — DEXTROAMPHETAMINE SACCHARATE, AMPHETAMINE ASPARTATE MONOHYDRATE, DEXTROAMPHETAMINE SULFATE AND AMPHETAMINE SULFATE 1.25; 1.25; 1.25; 1.25 MG/1; MG/1; MG/1; MG/1
5 CAPSULE, EXTENDED RELEASE ORAL DAILY
Qty: 30 CAPSULE | Refills: 0 | Status: SHIPPED | OUTPATIENT
Start: 2023-02-24 | End: 2023-03-26

## 2023-01-27 ASSESSMENT — ANXIETY QUESTIONNAIRES
GAD7 TOTAL SCORE: 0
2. NOT BEING ABLE TO STOP OR CONTROL WORRYING: 0
3. WORRYING TOO MUCH ABOUT DIFFERENT THINGS: 0
6. BECOMING EASILY ANNOYED OR IRRITABLE: 0
7. FEELING AFRAID AS IF SOMETHING AWFUL MIGHT HAPPEN: 0
1. FEELING NERVOUS, ANXIOUS, OR ON EDGE: 0
IF YOU CHECKED OFF ANY PROBLEMS ON THIS QUESTIONNAIRE, HOW DIFFICULT HAVE THESE PROBLEMS MADE IT FOR YOU TO DO YOUR WORK, TAKE CARE OF THINGS AT HOME, OR GET ALONG WITH OTHER PEOPLE: NOT DIFFICULT AT ALL
4. TROUBLE RELAXING: 0
5. BEING SO RESTLESS THAT IT IS HARD TO SIT STILL: 0

## 2023-01-27 ASSESSMENT — PATIENT HEALTH QUESTIONNAIRE - PHQ9
SUM OF ALL RESPONSES TO PHQ QUESTIONS 1-9: 0
3. TROUBLE FALLING OR STAYING ASLEEP: 0
8. MOVING OR SPEAKING SO SLOWLY THAT OTHER PEOPLE COULD HAVE NOTICED. OR THE OPPOSITE, BEING SO FIGETY OR RESTLESS THAT YOU HAVE BEEN MOVING AROUND A LOT MORE THAN USUAL: 0
10. IF YOU CHECKED OFF ANY PROBLEMS, HOW DIFFICULT HAVE THESE PROBLEMS MADE IT FOR YOU TO DO YOUR WORK, TAKE CARE OF THINGS AT HOME, OR GET ALONG WITH OTHER PEOPLE: 0
4. FEELING TIRED OR HAVING LITTLE ENERGY: 0
6. FEELING BAD ABOUT YOURSELF - OR THAT YOU ARE A FAILURE OR HAVE LET YOURSELF OR YOUR FAMILY DOWN: 0
SUM OF ALL RESPONSES TO PHQ QUESTIONS 1-9: 0
SUM OF ALL RESPONSES TO PHQ9 QUESTIONS 1 & 2: 0
2. FEELING DOWN, DEPRESSED OR HOPELESS: 0
5. POOR APPETITE OR OVEREATING: 0
9. THOUGHTS THAT YOU WOULD BE BETTER OFF DEAD, OR OF HURTING YOURSELF: 0
1. LITTLE INTEREST OR PLEASURE IN DOING THINGS: 0
7. TROUBLE CONCENTRATING ON THINGS, SUCH AS READING THE NEWSPAPER OR WATCHING TELEVISION: 0

## 2023-06-06 ENCOUNTER — OFFICE VISIT (OUTPATIENT)
Dept: PRIMARY CARE CLINIC | Age: 33
End: 2023-06-06
Payer: COMMERCIAL

## 2023-06-06 VITALS
BODY MASS INDEX: 25.48 KG/M2 | HEART RATE: 54 BPM | HEIGHT: 70 IN | SYSTOLIC BLOOD PRESSURE: 116 MMHG | WEIGHT: 178 LBS | DIASTOLIC BLOOD PRESSURE: 78 MMHG | TEMPERATURE: 97.9 F

## 2023-06-06 DIAGNOSIS — Z00.00 ROUTINE GENERAL MEDICAL EXAMINATION AT A HEALTH CARE FACILITY: Primary | ICD-10-CM

## 2023-06-06 DIAGNOSIS — F90.0 ATTENTION DEFICIT HYPERACTIVITY DISORDER (ADHD), PREDOMINANTLY INATTENTIVE TYPE: ICD-10-CM

## 2023-06-06 PROCEDURE — 99395 PREV VISIT EST AGE 18-39: CPT | Performed by: STUDENT IN AN ORGANIZED HEALTH CARE EDUCATION/TRAINING PROGRAM

## 2023-06-06 RX ORDER — DEXTROAMPHETAMINE SACCHARATE, AMPHETAMINE ASPARTATE MONOHYDRATE, DEXTROAMPHETAMINE SULFATE AND AMPHETAMINE SULFATE 1.25; 1.25; 1.25; 1.25 MG/1; MG/1; MG/1; MG/1
5 CAPSULE, EXTENDED RELEASE ORAL DAILY
Qty: 90 CAPSULE | Refills: 0 | Status: SHIPPED | OUTPATIENT
Start: 2023-06-06 | End: 2023-09-04

## 2023-06-06 SDOH — ECONOMIC STABILITY: FOOD INSECURITY: WITHIN THE PAST 12 MONTHS, YOU WORRIED THAT YOUR FOOD WOULD RUN OUT BEFORE YOU GOT MONEY TO BUY MORE.: NEVER TRUE

## 2023-06-06 SDOH — ECONOMIC STABILITY: FOOD INSECURITY: WITHIN THE PAST 12 MONTHS, THE FOOD YOU BOUGHT JUST DIDN'T LAST AND YOU DIDN'T HAVE MONEY TO GET MORE.: NEVER TRUE

## 2023-06-06 SDOH — ECONOMIC STABILITY: HOUSING INSECURITY
IN THE LAST 12 MONTHS, WAS THERE A TIME WHEN YOU DID NOT HAVE A STEADY PLACE TO SLEEP OR SLEPT IN A SHELTER (INCLUDING NOW)?: NO

## 2023-06-06 SDOH — ECONOMIC STABILITY: INCOME INSECURITY: HOW HARD IS IT FOR YOU TO PAY FOR THE VERY BASICS LIKE FOOD, HOUSING, MEDICAL CARE, AND HEATING?: NOT HARD AT ALL

## 2023-06-06 ASSESSMENT — ENCOUNTER SYMPTOMS
SHORTNESS OF BREATH: 0
COUGH: 0
BLOOD IN STOOL: 0

## 2023-06-06 NOTE — PROGRESS NOTES
2023    Joe Manning (:  1990) is a 29 y.o. male, here for evaluation of the following medical concerns:    HPI    Well Adult Physical: Patient here for a comprehensive physical exam.The patient reports no problems  Do you take any herbs or supplements that were not prescribed by a doctor? no Are you taking calcium supplements? not applicable Are you taking aspirin daily? not applicable    Sexual activity: has sex with females - condoms  Diet: Healthy  Exercise: weight training  3 time(s) per week  Seatbelt use: yes    Review of Systems   Constitutional:  Negative for activity change, fatigue and unexpected weight change. HENT:  Negative for hearing loss. Eyes:  Negative for visual disturbance. Respiratory:  Negative for cough and shortness of breath. Cardiovascular:  Negative for chest pain and leg swelling. Gastrointestinal:  Negative for blood in stool. Endocrine: Negative for polydipsia and polyphagia. Genitourinary:  Negative for dysuria, frequency, penile discharge, penile pain, scrotal swelling and testicular pain. Musculoskeletal:  Negative for arthralgias. Skin:  Negative for rash. Allergic/Immunologic: Negative for environmental allergies. Neurological:  Negative for dizziness and headaches. Hematological:  Does not bruise/bleed easily. Psychiatric/Behavioral:  Negative for dysphoric mood and sleep disturbance. The patient is not nervous/anxious. Prior to Visit Medications    Medication Sig Taking? Authorizing Provider   amphetamine-dextroamphetamine (ADDERALL XR) 5 MG extended release capsule Take 1 capsule by mouth daily for 90 days.  Max Daily Amount: 5 mg Yes Mortimer Smiling, DO        No Known Allergies    Past Medical History:   Diagnosis Date    Attention deficit hyperactivity disorder (ADHD), predominantly inattentive type 6/10/2022       Past Surgical History:   Procedure Laterality Date    KNEE SURGERY         Social History     Socioeconomic History

## 2023-07-07 DIAGNOSIS — F90.0 ATTENTION DEFICIT HYPERACTIVITY DISORDER (ADHD), PREDOMINANTLY INATTENTIVE TYPE: ICD-10-CM

## 2023-07-10 RX ORDER — DEXTROAMPHETAMINE SACCHARATE, AMPHETAMINE ASPARTATE MONOHYDRATE, DEXTROAMPHETAMINE SULFATE AND AMPHETAMINE SULFATE 1.25; 1.25; 1.25; 1.25 MG/1; MG/1; MG/1; MG/1
5 CAPSULE, EXTENDED RELEASE ORAL DAILY
Qty: 90 CAPSULE | Refills: 0 | Status: SHIPPED | OUTPATIENT
Start: 2023-07-10 | End: 2023-10-08

## 2023-07-10 NOTE — TELEPHONE ENCOUNTER
Medication:   Requested Prescriptions     Pending Prescriptions Disp Refills    amphetamine-dextroamphetamine (ADDERALL XR) 5 MG extended release capsule 90 capsule 0     Sig: Take 1 capsule by mouth daily for 90 days. Max Daily Amount: 5 mg        Last Filled:  06/06/23    Patient Phone Number: [phone] (home)     Last appt: 6/6/2023   Next appt: Visit date not found    Last OARRS: No flowsheet data found.

## 2023-07-11 ENCOUNTER — HOSPITAL ENCOUNTER (EMERGENCY)
Age: 33
Discharge: HOME | End: 2023-07-11
Attending: EMERGENCY MEDICINE
Payer: COMMERCIAL

## 2023-07-11 ENCOUNTER — APPOINTMENT (OUTPATIENT)
Dept: GENERAL RADIOLOGY | Age: 33
End: 2023-07-11
Payer: COMMERCIAL

## 2023-07-11 VITALS
OXYGEN SATURATION: 97 % | HEIGHT: 70 IN | DIASTOLIC BLOOD PRESSURE: 81 MMHG | SYSTOLIC BLOOD PRESSURE: 121 MMHG | TEMPERATURE: 98.3 F | BODY MASS INDEX: 24.92 KG/M2 | WEIGHT: 174.1 LBS | HEART RATE: 71 BPM | RESPIRATION RATE: 14 BRPM

## 2023-07-11 DIAGNOSIS — S40.022A CONTUSION OF MULTIPLE SITES OF LEFT SHOULDER AND UPPER ARM, INITIAL ENCOUNTER: Primary | ICD-10-CM

## 2023-07-11 DIAGNOSIS — S40.012A CONTUSION OF MULTIPLE SITES OF LEFT SHOULDER AND UPPER ARM, INITIAL ENCOUNTER: Primary | ICD-10-CM

## 2023-07-11 PROCEDURE — 73030 X-RAY EXAM OF SHOULDER: CPT

## 2023-07-11 PROCEDURE — 99283 EMERGENCY DEPT VISIT LOW MDM: CPT

## 2023-07-11 PROCEDURE — 6370000000 HC RX 637 (ALT 250 FOR IP): Performed by: EMERGENCY MEDICINE

## 2023-07-11 RX ORDER — NAPROXEN 500 MG/1
500 TABLET ORAL 2 TIMES DAILY
Qty: 20 TABLET | Refills: 0 | Status: SHIPPED | OUTPATIENT
Start: 2023-07-11 | End: 2023-07-21

## 2023-07-11 RX ORDER — NAPROXEN 500 MG/1
500 TABLET ORAL ONCE
Status: COMPLETED | OUTPATIENT
Start: 2023-07-11 | End: 2023-07-11

## 2023-07-11 RX ORDER — LIDOCAINE 50 MG/G
1 PATCH TOPICAL DAILY
Qty: 30 PATCH | Refills: 0 | Status: SHIPPED | OUTPATIENT
Start: 2023-07-11

## 2023-07-11 RX ADMIN — NAPROXEN 500 MG: 500 TABLET ORAL at 20:39

## 2023-07-11 ASSESSMENT — PAIN DESCRIPTION - LOCATION: LOCATION: ARM

## 2023-07-11 ASSESSMENT — PAIN SCALES - GENERAL: PAINLEVEL_OUTOF10: 4

## 2023-07-11 ASSESSMENT — PAIN DESCRIPTION - ORIENTATION: ORIENTATION: LEFT

## 2023-07-11 ASSESSMENT — PAIN DESCRIPTION - DESCRIPTORS: DESCRIPTORS: ACHING;DULL
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
## 2023-07-12 NOTE — ED NOTES
Pt discharged to home. Discharge instructions discussed along with 2 prescriptions and medication education completed. Pt verbalized understanding, and will follow up as indicated. Pt ambulated to lobby without assistance. Pt is alert and oriented, respirations are even and unlabored.         Erinn De Los Santos RN  07/11/23 6808

## 2023-07-12 NOTE — DISCHARGE INSTRUCTIONS
Discharge home  Ice to the affected area  Naprosyn for pain  Lidoderm patch  Follow-up with orthopedics as needed

## 2023-10-15 DIAGNOSIS — F90.0 ATTENTION DEFICIT HYPERACTIVITY DISORDER (ADHD), PREDOMINANTLY INATTENTIVE TYPE: ICD-10-CM

## 2023-10-16 RX ORDER — DEXTROAMPHETAMINE SACCHARATE, AMPHETAMINE ASPARTATE MONOHYDRATE, DEXTROAMPHETAMINE SULFATE AND AMPHETAMINE SULFATE 1.25; 1.25; 1.25; 1.25 MG/1; MG/1; MG/1; MG/1
5 CAPSULE, EXTENDED RELEASE ORAL DAILY
Qty: 90 CAPSULE | Refills: 0 | Status: SHIPPED | OUTPATIENT
Start: 2023-10-16 | End: 2023-10-19 | Stop reason: SDUPTHER

## 2023-10-16 NOTE — TELEPHONE ENCOUNTER
Medication:   Requested Prescriptions     Pending Prescriptions Disp Refills    amphetamine-dextroamphetamine (ADDERALL XR) 5 MG extended release capsule 90 capsule 0     Sig: Take 1 capsule by mouth daily for 90 days.  Max Daily Amount: 5 mg        Last Filled:  07/10/23    Patient Phone Number: [phone] (home)     Last appt: 6/6/2023 Return in about 6 months (around 12/6/2023) for ADD/ADHD  Next appt: Visit date not found    Last OARRS:        No data to display

## 2023-10-19 DIAGNOSIS — F90.0 ATTENTION DEFICIT HYPERACTIVITY DISORDER (ADHD), PREDOMINANTLY INATTENTIVE TYPE: ICD-10-CM

## 2023-10-19 RX ORDER — DEXTROAMPHETAMINE SACCHARATE, AMPHETAMINE ASPARTATE MONOHYDRATE, DEXTROAMPHETAMINE SULFATE AND AMPHETAMINE SULFATE 1.25; 1.25; 1.25; 1.25 MG/1; MG/1; MG/1; MG/1
5 CAPSULE, EXTENDED RELEASE ORAL DAILY
Qty: 90 CAPSULE | Refills: 0 | Status: SHIPPED | OUTPATIENT
Start: 2023-10-19 | End: 2024-01-17

## 2023-10-19 NOTE — TELEPHONE ENCOUNTER
Medication:   Requested Prescriptions     Pending Prescriptions Disp Refills    amphetamine-dextroamphetamine (ADDERALL XR) 5 MG extended release capsule 90 capsule 0     Sig: Take 1 capsule by mouth daily for 90 days. Max Daily Amount: 5 mg        Last Filled:  Patient comment: New location has this product in stock.     Patient Phone Number: [phone] (home)     Last appt: 6/6/2023   Next appt: Visit date not found    Last OARRS:        No data to display

## 2024-03-27 ASSESSMENT — PATIENT HEALTH QUESTIONNAIRE - PHQ9
SUM OF ALL RESPONSES TO PHQ QUESTIONS 1-9: 0
2. FEELING DOWN, DEPRESSED OR HOPELESS: NOT AT ALL
SUM OF ALL RESPONSES TO PHQ9 QUESTIONS 1 & 2: 0
SUM OF ALL RESPONSES TO PHQ QUESTIONS 1-9: 0
SUM OF ALL RESPONSES TO PHQ QUESTIONS 1-9: 0
2. FEELING DOWN, DEPRESSED OR HOPELESS: NOT AT ALL
1. LITTLE INTEREST OR PLEASURE IN DOING THINGS: NOT AT ALL
SUM OF ALL RESPONSES TO PHQ QUESTIONS 1-9: 0
1. LITTLE INTEREST OR PLEASURE IN DOING THINGS: NOT AT ALL
SUM OF ALL RESPONSES TO PHQ9 QUESTIONS 1 & 2: 0

## 2024-03-28 ENCOUNTER — OFFICE VISIT (OUTPATIENT)
Dept: PRIMARY CARE CLINIC | Age: 34
End: 2024-03-28

## 2024-03-28 VITALS
HEART RATE: 49 BPM | TEMPERATURE: 97.2 F | BODY MASS INDEX: 26.77 KG/M2 | WEIGHT: 187 LBS | DIASTOLIC BLOOD PRESSURE: 68 MMHG | HEIGHT: 70 IN | SYSTOLIC BLOOD PRESSURE: 109 MMHG

## 2024-03-28 DIAGNOSIS — Z00.00 ROUTINE GENERAL MEDICAL EXAMINATION AT A HEALTH CARE FACILITY: Primary | ICD-10-CM

## 2024-03-28 DIAGNOSIS — F90.0 ATTENTION DEFICIT HYPERACTIVITY DISORDER (ADHD), PREDOMINANTLY INATTENTIVE TYPE: ICD-10-CM

## 2024-03-28 RX ORDER — DEXTROAMPHETAMINE SACCHARATE, AMPHETAMINE ASPARTATE MONOHYDRATE, DEXTROAMPHETAMINE SULFATE AND AMPHETAMINE SULFATE 1.25; 1.25; 1.25; 1.25 MG/1; MG/1; MG/1; MG/1
5 CAPSULE, EXTENDED RELEASE ORAL DAILY
Qty: 90 CAPSULE | Refills: 0 | Status: SHIPPED | OUTPATIENT
Start: 2024-03-28 | End: 2024-06-26

## 2024-03-28 ASSESSMENT — ENCOUNTER SYMPTOMS
BLOOD IN STOOL: 0
COUGH: 0
SHORTNESS OF BREATH: 0

## 2024-03-28 NOTE — PROGRESS NOTES
History    Marital status: Single     Spouse name: Not on file    Number of children: 0    Years of education: Not on file    Highest education level: Bachelor's degree (e.g., BA, AB, BS)   Occupational History    Occupation: [employer]    Tobacco Use    Smoking status: Every Day    Smokeless tobacco: Never    Tobacco comments:     Vaping   Substance and Sexual Activity    Alcohol use: Yes     Comment: drinks once a week    Drug use: Yes     Types: Marijuana (Weed)     Comment: Recreational 1-2 times per week    Sexual activity: Yes     Partners: Female     Birth control/protection: Condom   Other Topics Concern    Not on file   Social History Narrative    Patient acknowledges that he and his girlfriend just recently moved in together, noting that this has increased some stress in his life.  They have been together for slightly under 9 months at this time.  They have 3 dogs together.Patient acknowledges that he is always gotten good grades throughout school, though he was noted to be a \"class clown\".  He graduated with a degree in biomedical engineering.No guns in the home, no  service for the patient, no legal issues at this time.     Social Determinants of Health     Financial Resource Strain: Low Risk  (6/6/2023)    Overall Financial Resource Strain (CARDIA)     Difficulty of Paying Living Expenses: Not hard at all   Food Insecurity: Not on file (6/6/2023)   Transportation Needs: Unknown (6/6/2023)    PRAPARE - Transportation     Lack of Transportation (Medical): Not on file     Lack of Transportation (Non-Medical): No   Physical Activity: Not on file   Stress: Not on file   Social Connections: Not on file   Intimate Partner Violence: Not on file   Housing Stability: Unknown (6/6/2023)    Housing Stability Vital Sign     Unable to Pay for Housing in the Last Year: Not on file     Number of Places Lived in the Last Year: Not on file     Unstable Housing in the Last Year: No

## 2024-04-30 DIAGNOSIS — F90.0 ATTENTION DEFICIT HYPERACTIVITY DISORDER (ADHD), PREDOMINANTLY INATTENTIVE TYPE: ICD-10-CM

## 2024-04-30 RX ORDER — DEXTROAMPHETAMINE SACCHARATE, AMPHETAMINE ASPARTATE MONOHYDRATE, DEXTROAMPHETAMINE SULFATE AND AMPHETAMINE SULFATE 1.25; 1.25; 1.25; 1.25 MG/1; MG/1; MG/1; MG/1
5 CAPSULE, EXTENDED RELEASE ORAL DAILY
Qty: 90 CAPSULE | Refills: 0 | Status: CANCELLED | OUTPATIENT
Start: 2024-04-30 | End: 2024-07-29

## 2024-05-01 DIAGNOSIS — F90.0 ATTENTION DEFICIT HYPERACTIVITY DISORDER (ADHD), PREDOMINANTLY INATTENTIVE TYPE: ICD-10-CM

## 2024-05-01 RX ORDER — DEXTROAMPHETAMINE SACCHARATE, AMPHETAMINE ASPARTATE MONOHYDRATE, DEXTROAMPHETAMINE SULFATE AND AMPHETAMINE SULFATE 1.25; 1.25; 1.25; 1.25 MG/1; MG/1; MG/1; MG/1
5 CAPSULE, EXTENDED RELEASE ORAL DAILY
Qty: 90 CAPSULE | Refills: 0 | Status: SHIPPED | OUTPATIENT
Start: 2024-05-01 | End: 2024-07-30

## 2024-05-01 NOTE — PROGRESS NOTES
Medication:   Requested Prescriptions     Pending Prescriptions Disp Refills    amphetamine-dextroamphetamine (ADDERALL XR) 5 MG extended release capsule 90 capsule 0     Sig: Take 1 capsule by mouth daily for 90 days. Max Daily Amount: 5 mg        Last Filled:  03/28/24 didn't have in stock, needs sent to this new pharm    Patient Phone Number: [phone] (home)     Last appt: 3/28/2024   Next appt: Visit date not found    Last OARRS:        No data to display

## 2024-07-22 DIAGNOSIS — F90.0 ATTENTION DEFICIT HYPERACTIVITY DISORDER (ADHD), PREDOMINANTLY INATTENTIVE TYPE: ICD-10-CM

## 2024-07-22 RX ORDER — DEXTROAMPHETAMINE SACCHARATE, AMPHETAMINE ASPARTATE MONOHYDRATE, DEXTROAMPHETAMINE SULFATE AND AMPHETAMINE SULFATE 1.25; 1.25; 1.25; 1.25 MG/1; MG/1; MG/1; MG/1
5 CAPSULE, EXTENDED RELEASE ORAL DAILY
Qty: 90 CAPSULE | Refills: 0 | Status: SHIPPED | OUTPATIENT
Start: 2024-07-22 | End: 2024-10-20

## 2024-07-22 NOTE — TELEPHONE ENCOUNTER
Medication:   Requested Prescriptions     Pending Prescriptions Disp Refills    amphetamine-dextroamphetamine (ADDERALL XR) 5 MG extended release capsule 90 capsule 0     Sig: Take 1 capsule by mouth daily for 90 days. Max Daily Amount: 5 mg        Last Filled:  05/1/24    Patient Phone Number: [phone] (home)     Last appt: 3/28/2024  Return in about 6 months (around 9/28/2024) for ADD/ADHD.  Next appt: Visit date not found    Last OARRS:        No data to display

## 2024-10-22 DIAGNOSIS — F90.0 ATTENTION DEFICIT HYPERACTIVITY DISORDER (ADHD), PREDOMINANTLY INATTENTIVE TYPE: ICD-10-CM

## 2024-10-22 RX ORDER — DEXTROAMPHETAMINE SACCHARATE, AMPHETAMINE ASPARTATE MONOHYDRATE, DEXTROAMPHETAMINE SULFATE AND AMPHETAMINE SULFATE 1.25; 1.25; 1.25; 1.25 MG/1; MG/1; MG/1; MG/1
5 CAPSULE, EXTENDED RELEASE ORAL DAILY
Qty: 90 CAPSULE | Refills: 0 | OUTPATIENT
Start: 2024-10-22 | End: 2025-01-20

## 2024-10-22 NOTE — TELEPHONE ENCOUNTER
Medication:   Requested Prescriptions     Refused Prescriptions Disp Refills    amphetamine-dextroamphetamine (ADDERALL XR) 5 MG extended release capsule 90 capsule 0     Sig: Take 1 capsule by mouth daily for 90 days. Max Daily Amount: 5 mg        Last Filled:  Pt is OVERDUE for appointment  Return in about 6 months (around 9/28/2024) for ADD/ADHD.     Patient Phone Number: [phone] (home)     Last appt: 3/28/2024   Return in about 6 months (around 9/28/2024) for ADD/ADHD.   Next appt: Visit date not found    Last OARRS:        No data to display                [employer] message sent for pt to schedule.

## 2024-10-29 SDOH — ECONOMIC STABILITY: FOOD INSECURITY: WITHIN THE PAST 12 MONTHS, YOU WORRIED THAT YOUR FOOD WOULD RUN OUT BEFORE YOU GOT MONEY TO BUY MORE.: NEVER TRUE

## 2024-10-29 SDOH — ECONOMIC STABILITY: TRANSPORTATION INSECURITY
IN THE PAST 12 MONTHS, HAS LACK OF TRANSPORTATION KEPT YOU FROM MEETINGS, WORK, OR FROM GETTING THINGS NEEDED FOR DAILY LIVING?: NO

## 2024-10-29 SDOH — ECONOMIC STABILITY: FOOD INSECURITY: WITHIN THE PAST 12 MONTHS, THE FOOD YOU BOUGHT JUST DIDN'T LAST AND YOU DIDN'T HAVE MONEY TO GET MORE.: NEVER TRUE

## 2024-10-29 SDOH — ECONOMIC STABILITY: INCOME INSECURITY: HOW HARD IS IT FOR YOU TO PAY FOR THE VERY BASICS LIKE FOOD, HOUSING, MEDICAL CARE, AND HEATING?: NOT HARD AT ALL

## 2024-10-30 ENCOUNTER — OFFICE VISIT (OUTPATIENT)
Dept: PRIMARY CARE CLINIC | Age: 34
End: 2024-10-30
Payer: COMMERCIAL

## 2024-10-30 VITALS
WEIGHT: 184 LBS | OXYGEN SATURATION: 96 % | HEIGHT: 70 IN | TEMPERATURE: 96.8 F | BODY MASS INDEX: 26.34 KG/M2 | DIASTOLIC BLOOD PRESSURE: 79 MMHG | HEART RATE: 60 BPM | SYSTOLIC BLOOD PRESSURE: 129 MMHG

## 2024-10-30 DIAGNOSIS — F90.0 ATTENTION DEFICIT HYPERACTIVITY DISORDER (ADHD), PREDOMINANTLY INATTENTIVE TYPE: ICD-10-CM

## 2024-10-30 PROCEDURE — 99214 OFFICE O/P EST MOD 30 MIN: CPT | Performed by: STUDENT IN AN ORGANIZED HEALTH CARE EDUCATION/TRAINING PROGRAM

## 2024-10-30 RX ORDER — DEXTROAMPHETAMINE SACCHARATE, AMPHETAMINE ASPARTATE MONOHYDRATE, DEXTROAMPHETAMINE SULFATE AND AMPHETAMINE SULFATE 1.25; 1.25; 1.25; 1.25 MG/1; MG/1; MG/1; MG/1
5 CAPSULE, EXTENDED RELEASE ORAL DAILY
Qty: 90 CAPSULE | Refills: 0 | Status: SHIPPED | OUTPATIENT
Start: 2024-10-30 | End: 2025-01-28

## 2024-10-30 ASSESSMENT — ENCOUNTER SYMPTOMS
CHEST TIGHTNESS: 0
COUGH: 0
SHORTNESS OF BREATH: 0

## 2024-10-30 NOTE — PROGRESS NOTES
10/30/2024     Duy Nye (:  1990) is a 33 y.o. male, here for evaluation of the following medical concerns:    HPI    ADD/ADHD:  Current treatment: Adderall XR- 5, which has been effective.  Residual symptoms: none. Medication side effects: None. Patient denies anorexia, nausea, vomiting, abdominal pain, involuntary weight loss, palpitations, insomnia, irritability, anxiety, headache, and tremor.    Review of Systems   Constitutional:  Negative for activity change, fatigue and unexpected weight change.   Respiratory:  Negative for cough, chest tightness and shortness of breath.    Cardiovascular:  Negative for chest pain and palpitations.   Genitourinary:  Negative for frequency.   Neurological:  Negative for dizziness, seizures, syncope, weakness and light-headedness.   Psychiatric/Behavioral:  Negative for agitation, decreased concentration, dysphoric mood, self-injury, sleep disturbance and suicidal ideas. The patient is not nervous/anxious.        Prior to Visit Medications    Medication Sig Taking? Authorizing Provider   amphetamine-dextroamphetamine (ADDERALL XR) 5 MG extended release capsule Take 1 capsule by mouth daily for 90 days. Max Daily Amount: 5 mg Yes Lonnie Quiroz DO        Social History     Tobacco Use    Smoking status: Every Day    Smokeless tobacco: Never    Tobacco comments:     Vaping   Substance Use Topics    Alcohol use: Yes     Comment: drinks once a week        Vitals:    10/30/24 0839   BP: 129/79   Pulse: 60   Temp: 96.8 °F (36 °C)   SpO2: 96%   Weight: 83.5 kg (184 lb)   Height: 1.778 m (5' 10\")     Estimated body mass index is 26.4 kg/m² as calculated from the following:    Height as of this encounter: 1.778 m (5' 10\").    Weight as of this encounter: 83.5 kg (184 lb).    Physical Exam  Vitals reviewed.   Constitutional:       Appearance: Normal appearance.   HENT:      Head: Normocephalic and atraumatic.   Cardiovascular:      Rate and Rhythm: Normal rate.

## 2025-01-26 DIAGNOSIS — F90.0 ATTENTION DEFICIT HYPERACTIVITY DISORDER (ADHD), PREDOMINANTLY INATTENTIVE TYPE: ICD-10-CM

## 2025-01-27 RX ORDER — DEXTROAMPHETAMINE SACCHARATE, AMPHETAMINE ASPARTATE MONOHYDRATE, DEXTROAMPHETAMINE SULFATE AND AMPHETAMINE SULFATE 1.25; 1.25; 1.25; 1.25 MG/1; MG/1; MG/1; MG/1
5 CAPSULE, EXTENDED RELEASE ORAL DAILY
Qty: 90 CAPSULE | Refills: 0 | Status: SHIPPED | OUTPATIENT
Start: 2025-01-27 | End: 2025-04-27

## 2025-01-27 NOTE — TELEPHONE ENCOUNTER
Medication:   Requested Prescriptions     Pending Prescriptions Disp Refills    amphetamine-dextroamphetamine (ADDERALL XR) 5 MG extended release capsule 90 capsule 0     Sig: Take 1 capsule by mouth daily for 90 days. Max Daily Amount: 5 mg        Last Filled:  10/30/24    Patient Phone Number: [phone] (home)     Last appt: 10/30/2024 Return in about 6 months (around 4/30/2025) for ADD/ADHD.  Next appt: Visit date not found    Last OARRS:        No data to display

## 2025-04-21 DIAGNOSIS — F90.0 ATTENTION DEFICIT HYPERACTIVITY DISORDER (ADHD), PREDOMINANTLY INATTENTIVE TYPE: ICD-10-CM

## 2025-04-21 RX ORDER — DEXTROAMPHETAMINE SACCHARATE, AMPHETAMINE ASPARTATE MONOHYDRATE, DEXTROAMPHETAMINE SULFATE AND AMPHETAMINE SULFATE 1.25; 1.25; 1.25; 1.25 MG/1; MG/1; MG/1; MG/1
5 CAPSULE, EXTENDED RELEASE ORAL DAILY
Qty: 90 CAPSULE | Refills: 0 | Status: SHIPPED | OUTPATIENT
Start: 2025-04-21 | End: 2025-07-20

## 2025-04-21 NOTE — TELEPHONE ENCOUNTER
Medication:   Requested Prescriptions     Pending Prescriptions Disp Refills    amphetamine-dextroamphetamine (ADDERALL XR) 5 MG extended release capsule 90 capsule 0     Sig: Take 1 capsule by mouth daily for 90 days. Max Daily Amount: 5 mg        Last Filled:  1/27/25    Patient Phone Number: [phone] (home)     Last appt: 10/30/2024   Next appt: Visit date not found      Return in about 6 months (around 4/30/2025) for ADD/ADHD.

## 2025-04-21 NOTE — TELEPHONE ENCOUNTER
Medication:   Requested Prescriptions     Pending Prescriptions Disp Refills    amphetamine-dextroamphetamine (ADDERALL XR) 5 MG extended release capsule 90 capsule 0     Sig: Take 1 capsule by mouth daily for 90 days. Max Daily Amount: 5 mg        Last Filled:  01/27/25    Patient Phone Number: [phone] (home)     Last appt: 10/30/2024 Return in about 6 months (around 4/30/2025) for ADD/ADHD.   Next appt: Visit date not found    Last OARRS:        No data to display

## 2025-04-25 SDOH — ECONOMIC STABILITY: FOOD INSECURITY: WITHIN THE PAST 12 MONTHS, YOU WORRIED THAT YOUR FOOD WOULD RUN OUT BEFORE YOU GOT MONEY TO BUY MORE.: NEVER TRUE

## 2025-04-25 SDOH — ECONOMIC STABILITY: INCOME INSECURITY: IN THE LAST 12 MONTHS, WAS THERE A TIME WHEN YOU WERE NOT ABLE TO PAY THE MORTGAGE OR RENT ON TIME?: NO

## 2025-04-25 SDOH — ECONOMIC STABILITY: FOOD INSECURITY: WITHIN THE PAST 12 MONTHS, THE FOOD YOU BOUGHT JUST DIDN'T LAST AND YOU DIDN'T HAVE MONEY TO GET MORE.: NEVER TRUE

## 2025-04-25 SDOH — ECONOMIC STABILITY: TRANSPORTATION INSECURITY
IN THE PAST 12 MONTHS, HAS THE LACK OF TRANSPORTATION KEPT YOU FROM MEDICAL APPOINTMENTS OR FROM GETTING MEDICATIONS?: NO

## 2025-04-25 ASSESSMENT — PATIENT HEALTH QUESTIONNAIRE - PHQ9
SUM OF ALL RESPONSES TO PHQ QUESTIONS 1-9: 0
2. FEELING DOWN, DEPRESSED OR HOPELESS: NOT AT ALL
SUM OF ALL RESPONSES TO PHQ9 QUESTIONS 1 & 2: 0
SUM OF ALL RESPONSES TO PHQ QUESTIONS 1-9: 0
1. LITTLE INTEREST OR PLEASURE IN DOING THINGS: NOT AT ALL
1. LITTLE INTEREST OR PLEASURE IN DOING THINGS: NOT AT ALL
2. FEELING DOWN, DEPRESSED OR HOPELESS: NOT AT ALL

## 2025-04-28 ENCOUNTER — OFFICE VISIT (OUTPATIENT)
Dept: PRIMARY CARE CLINIC | Age: 35
End: 2025-04-28
Payer: COMMERCIAL

## 2025-04-28 VITALS
HEART RATE: 48 BPM | TEMPERATURE: 97.7 F | SYSTOLIC BLOOD PRESSURE: 112 MMHG | DIASTOLIC BLOOD PRESSURE: 64 MMHG | BODY MASS INDEX: 26.66 KG/M2 | HEIGHT: 70 IN | WEIGHT: 186.2 LBS

## 2025-04-28 DIAGNOSIS — F90.0 ATTENTION DEFICIT HYPERACTIVITY DISORDER (ADHD), PREDOMINANTLY INATTENTIVE TYPE: ICD-10-CM

## 2025-04-28 PROCEDURE — 99214 OFFICE O/P EST MOD 30 MIN: CPT | Performed by: STUDENT IN AN ORGANIZED HEALTH CARE EDUCATION/TRAINING PROGRAM

## 2025-04-28 RX ORDER — DEXTROAMPHETAMINE SACCHARATE, AMPHETAMINE ASPARTATE MONOHYDRATE, DEXTROAMPHETAMINE SULFATE AND AMPHETAMINE SULFATE 1.25; 1.25; 1.25; 1.25 MG/1; MG/1; MG/1; MG/1
5 CAPSULE, EXTENDED RELEASE ORAL DAILY
Qty: 90 CAPSULE | Refills: 0 | Status: SHIPPED | OUTPATIENT
Start: 2025-04-28 | End: 2025-04-28

## 2025-04-28 RX ORDER — DEXTROAMPHETAMINE SACCHARATE, AMPHETAMINE ASPARTATE MONOHYDRATE, DEXTROAMPHETAMINE SULFATE AND AMPHETAMINE SULFATE 2.5; 2.5; 2.5; 2.5 MG/1; MG/1; MG/1; MG/1
10 CAPSULE, EXTENDED RELEASE ORAL DAILY
Qty: 30 CAPSULE | Refills: 0
Start: 2025-04-28 | End: 2025-05-28

## 2025-04-28 ASSESSMENT — ENCOUNTER SYMPTOMS
SHORTNESS OF BREATH: 0
CHEST TIGHTNESS: 0
COUGH: 0

## 2025-04-28 NOTE — PATIENT INSTRUCTIONS
may need a single dorm room.     Work on relationships. Social skills training can help adults with ADHD relate to family, friends, and coworkers. Couples counseling or family therapy can also help improve relationships.   Counseling  Counseling is not meant to treat inattention, hyperactivity, or impulsiveness. But it can help with some of the problems that go along with ADHD. These include not getting along well with others and having problems following rules.  Where can you learn more?  Go to https://www.RxResults.net/patientEd and enter Z848 to learn more about \"Learning About Attention Deficit Hyperactivity Disorder (ADHD) in Adults.\"  Current as of: July 31, 2024  Content Version: 14.4  © 6131-5465 [employer].   Care instructions adapted under license by [employer]. If you have questions about a medical condition or this instruction, always ask your healthcare professional. [employer] disclaims any warranty or liability for your use of this information.

## 2025-04-28 NOTE — PROGRESS NOTES
Rhythm: Normal rate.   Pulmonary:      Effort: Pulmonary effort is normal.   Skin:     General: Skin is warm and dry.      Capillary Refill: Capillary refill takes less than 2 seconds.   Neurological:      Mental Status: He is alert. Mental status is at baseline.   Psychiatric:         Mood and Affect: Mood normal.         Behavior: Behavior normal.         Thought Content: Thought content normal.         Judgment: Judgment normal.       ASSESSMENT/PLAN:    Diagnoses and all orders for this visit:    Attention deficit hyperactivity disorder (ADHD), predominantly inattentive type: Symptoms well managed on current regimen.  Tolerating well without side effects.  OARRS reviewed and appropriate.  Refill given and okay for routine follow-up in 6 months.  -     amphetamine-dextroamphetamine (ADDERALL XR) 10 MG extended release capsule; Take 1 capsule by mouth daily for 30 days. Max Daily Amount: 10 mg    Return in about 6 months (around 10/28/2025) for ADD/ADHD.    An electronic signature was used to authenticate this note.    --Lnonie Quiroz DO on 4/28/2025 at 11:00 AM

## 2025-05-15 NOTE — TELEPHONE ENCOUNTER
From: Ela Elaine  To: Dr. Junaid Haynes: 3/7/2022 10:57 AM EST  Subject: Hay Fever - Horrible on Eyes    Hi Dr. Layo Odell,    It was great meeting you Friday. You are very kind, energetic, and insightful. Im glad I found a Doctor I can trust and talk to with ease. I typically get very bad allergies this time of year. Symptoms align with allergic rhinitis (severe from my perspective). OTC meds are helpful in reducing symptoms, but only a fraction. I usually do a combo of Flonase and Benadryl (which makes me very sleepy during the day). Nothing really helps take the edge off for my eyes though. This is the worst part every year. Monster had Zaditor prescribed in the past, and this helps more than OTC eye drops. Are there any other options to help? If not, could we look at setting up a Zaditor prescription?     Thanks,  Ela Elaine Subjective   History of Present Illness  This is a 58-year-old male that presents to the ER with intractable pain related to right renal colic and intractable nausea with vomiting x 2 days.  Patient had sudden onset of right lower quadrant abdominal discomfort, right flank pain, and nausea/vomiting since yesterday.  He was seen and evaluated in our ER and diagnosed with a right 4 to 5 mm distal ureteral stone.  Patient had microscopic red blood cells on urinalysis and creatinine was 1.4.  Patient was prescribed short course of Norco for moderate pain, Zofran, and Flomax.  He was given follow-up info for Dr. Shiraz Greer, urology.  Patient has never had kidney stone in the past.  He has past medical history of hypertension, hyperlipidemia, and GERD.  Patient has no personal history of gout.  Patient denies any dysuria, urgency, or frequency.  When he woke up on the morning of 5/14/2025, he continued to have some right renal colic that significantly worsened.  Pain is sharp and stabbing and radiates from right lower back to the right flank and right lower quadrant.  He has had intractable vomiting and vomited at least 10-12 times.  He is having trouble keeping his medications down.  Patient has not had a bowel movement in 2 days.  He denies fever or chills.  Patient denies any previous abdominal surgeries.  Patient concerned due to intractable pain and nausea/vomiting.    History provided by:  Patient and medical records  Flank Pain  Pain location:  R flank and RLQ  Pain quality: sharp and stabbing    Pain radiates to:  Back (Right lower back)  Pain severity:  Moderate  Onset quality:  Sudden  Duration:  2 days  Timing:  Constant  Progression:  Worsening  Chronicity:  New  Context: alcohol use (The olvera use.  No personal history of gout.)    Context: not previous surgeries    Context comment:  Patient diagnosed with right 4 mm  distal ureteral stone yesterday through our ER on 5/13/2025.  Rx for Norco, Flomax, and  Zofran.  Patient having intractable pain and intractable n/v today.  Relieved by:  Nothing  Worsened by:  Nothing  Ineffective treatments: I asked her ER yesterday for Norco 5 mg for moderate pain, Flomax, and Zofran.  Associated symptoms: anorexia, nausea and vomiting (Wanting at least 10-12 times in the last 24 hours)    Associated symptoms: no belching, no chest pain, no chills, no diarrhea, no dysuria, no fatigue, no fever, no flatus, no hematemesis, no hematochezia, no hematuria and no melena    Risk factors: has not had multiple surgeries and no NSAID use    Risk factors comment:  No personal history of kidney stones prior to the current episode.      Review of Systems   Constitutional:  Positive for appetite change. Negative for chills, fatigue and fever.   HENT: Negative.     Respiratory: Negative.     Cardiovascular: Negative.  Negative for chest pain, palpitations and leg swelling.   Gastrointestinal:  Positive for abdominal pain (Right lower quadrant abdominal discomfort), anorexia, nausea and vomiting (Wanting at least 10-12 times in the last 24 hours). Negative for diarrhea, flatus, hematemesis, hematochezia and melena.   Genitourinary:  Positive for flank pain (Right sided). Negative for dysuria, frequency, hematuria and urgency.   Musculoskeletal:  Positive for back pain (Right low back pain).   Neurological: Negative.  Negative for dizziness, syncope, light-headedness and headaches.   All other systems reviewed and are negative.      History reviewed. No pertinent past medical history.    No Known Allergies    No past surgical history on file.    History reviewed. No pertinent family history.    Social History     Socioeconomic History    Marital status:    Tobacco Use    Smoking status: Never    Smokeless tobacco: Never   Vaping Use    Vaping status: Never Used   Substance and Sexual Activity    Alcohol use: Yes    Drug use: Never    Sexual activity: Defer           Objective   Physical  Exam  Vitals and nursing note reviewed.   Constitutional:       General: He is not in acute distress.     Appearance: Normal appearance. He is not ill-appearing, toxic-appearing or diaphoretic.      Comments: Patient appears uncomfortable secondary to acute right renal colic.  Nontoxic.  No acute distress.   HENT:      Head: Normocephalic and atraumatic.      Nose: Nose normal.      Mouth/Throat:      Mouth: Mucous membranes are moist.      Pharynx: Oropharynx is clear.      Comments: Oral mucous membranes are still moist  Eyes:      Extraocular Movements: Extraocular movements intact.      Conjunctiva/sclera: Conjunctivae normal.      Pupils: Pupils are equal, round, and reactive to light.   Cardiovascular:      Rate and Rhythm: Normal rate and regular rhythm. No extrasystoles are present.     Pulses: Normal pulses.      Heart sounds: Normal heart sounds.      Comments: Regular rate and rhythm.  No tachycardia or ectopy.  No pedal edema to lower extremities.  Pulmonary:      Effort: Pulmonary effort is normal.      Breath sounds: Normal breath sounds.      Comments: Lungs are clear to auscultation bilaterally  Abdominal:      General: Bowel sounds are normal. There is no distension.      Palpations: Abdomen is soft.      Tenderness: There is abdominal tenderness. There is right CVA tenderness. There is no left CVA tenderness, guarding or rebound. Negative signs include Allen's sign, Rovsing's sign, McBurney's sign, psoas sign and obturator sign.      Hernia: No hernia is present. There is no hernia in the umbilical area or ventral area.      Comments: Central obesity.  Soft with active bowel sounds in all 4 quadrants.  Mild tenderness to right flank and right lower quadrant.  No rebound or guarding.  Negative Allen sign.  No particular CVA tenderness.  Abdominal exam is benign and nonsurgical.   Musculoskeletal:         General: Normal range of motion.      Cervical back: Normal range of motion and neck supple.       Right lower leg: No edema.      Left lower leg: No edema.   Skin:     General: Skin is warm and dry.   Neurological:      General: No focal deficit present.      Mental Status: He is alert and oriented to person, place, and time.      Cranial Nerves: Cranial nerves 2-12 are intact.      Sensory: Sensation is intact.      Motor: Motor function is intact.      Coordination: Coordination is intact.      Gait: Gait is intact.      Comments: Alert and oriented x 3.  Neuro intact and nonfocal   Psychiatric:         Mood and Affect: Mood and affect normal.         Speech: Speech normal.         Behavior: Behavior is cooperative.         Thought Content: Thought content normal.         Cognition and Memory: Cognition and memory normal.         Judgment: Judgment normal.      Comments: Anxious and uncomfortable secondary to right renal colic         Procedures           ED Course  ED Course as of 05/15/25 0537   Thu May 15, 2025   0123 Discussed diagnostic work-up with Dr. Francisco, ER attending physician. Recommended paging on-call Urology, Dr. Dasilva.  [FC]   0507 Patient was afebrile and all other vital signs were stable.  CBC showed white blood cell count of 10.81 with 86% neutrophils on the differential.  Chemistries reveal BUN and creatinine 19 and 1.61.  Creatinine yesterday during the ER course on 5/13/2025 was 1.4.  Lactic acid is 1.5.  Lipase is 17.  Repeat urinalysis reveals large with 3+ blood, negative nitrite, negative leukocytes, no bacteria, and too numerous to count red blood cells.  We proceeded with CT of the abdomen/pelvis without contrast for further assessment of persistent worsening right renal colic with known 4 mm right distal ureteral stone.  CT scan revealed left kidney normal with standing, of excreted contrast from the right kidney down to the level of the distal ureter where there is a stable 4 mm stone.  Perinephric fat stranding.  Patient given IV fluid bolus, Flomax 0.4 mg by mouth and IV  morphine with subsequent Dilaudid for significant right renal colic.  Discussed the case with Dr. Dasilva, on-call urologist, who is happy to consult in the morning.  Discussed admission with Dr. Hale, hospitalist for intractable nausea/vomiting and intractable pain.  He is agreeable to admission on telemetry.  Urology will be consulted this morning.  Dr. Dasilva did not recommend any antibiotics at this time. [FC]      ED Course User Index  [FC] Jesika Hammer, MAMI                                           Recent Results (from the past 24 hours)   Comprehensive Metabolic Panel    Collection Time: 05/14/25 11:34 PM    Specimen: Blood   Result Value Ref Range    Glucose 124 (H) 65 - 99 mg/dL    BUN 19 6 - 20 mg/dL    Creatinine 1.61 (H) 0.76 - 1.27 mg/dL    Sodium 142 136 - 145 mmol/L    Potassium 3.9 3.5 - 5.2 mmol/L    Chloride 107 98 - 107 mmol/L    CO2 22.0 22.0 - 29.0 mmol/L    Calcium 9.2 8.6 - 10.5 mg/dL    Total Protein 6.7 6.0 - 8.5 g/dL    Albumin 4.2 3.5 - 5.2 g/dL    ALT (SGPT) 18 1 - 41 U/L    AST (SGOT) 25 1 - 40 U/L    Alkaline Phosphatase 52 39 - 117 U/L    Total Bilirubin 0.5 0.0 - 1.2 mg/dL    Globulin 2.5 gm/dL    A/G Ratio 1.7 g/dL    BUN/Creatinine Ratio 11.8 7.0 - 25.0    Anion Gap 13.0 5.0 - 15.0 mmol/L    eGFR 49.9 (L) >60.0 mL/min/1.73   Lipase    Collection Time: 05/14/25 11:34 PM    Specimen: Blood   Result Value Ref Range    Lipase 17 13 - 60 U/L   Lactic Acid, Plasma    Collection Time: 05/14/25 11:34 PM    Specimen: Blood   Result Value Ref Range    Lactate 1.5 0.5 - 2.0 mmol/L   Green Top (Gel)    Collection Time: 05/14/25 11:34 PM   Result Value Ref Range    Extra Tube Hold for add-ons.    Lavender Top    Collection Time: 05/14/25 11:34 PM   Result Value Ref Range    Extra Tube hold for add-on    Gold Top - SST    Collection Time: 05/14/25 11:34 PM   Result Value Ref Range    Extra Tube Hold for add-ons.    Gray Top    Collection Time: 05/14/25 11:34 PM   Result Value Ref Range     Extra Tube Hold for add-ons.    Light Blue Top    Collection Time: 05/14/25 11:34 PM   Result Value Ref Range    Extra Tube Hold for add-ons.    CBC Auto Differential    Collection Time: 05/14/25 11:34 PM    Specimen: Blood   Result Value Ref Range    WBC 10.81 (H) 3.40 - 10.80 10*3/mm3    RBC 4.55 4.14 - 5.80 10*6/mm3    Hemoglobin 13.4 13.0 - 17.7 g/dL    Hematocrit 40.5 37.5 - 51.0 %    MCV 89.0 79.0 - 97.0 fL    MCH 29.5 26.6 - 33.0 pg    MCHC 33.1 31.5 - 35.7 g/dL    RDW 13.2 12.3 - 15.4 %    RDW-SD 43.3 37.0 - 54.0 fl    MPV 10.6 6.0 - 12.0 fL    Platelets 232 140 - 450 10*3/mm3    Neutrophil % 86.3 (H) 42.7 - 76.0 %    Lymphocyte % 5.6 (L) 19.6 - 45.3 %    Monocyte % 6.7 5.0 - 12.0 %    Eosinophil % 0.4 0.3 - 6.2 %    Basophil % 0.4 0.0 - 1.5 %    Immature Grans % 0.6 (H) 0.0 - 0.5 %    Neutrophils, Absolute 9.35 (H) 1.70 - 7.00 10*3/mm3    Lymphocytes, Absolute 0.60 (L) 0.70 - 3.10 10*3/mm3    Monocytes, Absolute 0.72 0.10 - 0.90 10*3/mm3    Eosinophils, Absolute 0.04 0.00 - 0.40 10*3/mm3    Basophils, Absolute 0.04 0.00 - 0.20 10*3/mm3    Immature Grans, Absolute 0.06 (H) 0.00 - 0.05 10*3/mm3    nRBC 0.0 0.0 - 0.2 /100 WBC   Urinalysis With Microscopic If Indicated (No Culture) - Urine, Clean Catch    Collection Time: 05/15/25 12:39 AM    Specimen: Urine, Clean Catch   Result Value Ref Range    Color, UA Yellow Yellow, Straw    Appearance, UA Clear Clear    pH, UA 5.5 5.0 - 8.0    Specific Gravity, UA 1.024 1.001 - 1.030    Glucose, UA Negative Negative    Ketones, UA Trace (A) Negative    Bilirubin, UA Negative Negative    Blood, UA Large (3+) (A) Negative    Protein, UA Trace (A) Negative    Leuk Esterase, UA Negative Negative    Nitrite, UA Negative Negative    Urobilinogen, UA 1.0 E.U./dL 0.2 - 1.0 E.U./dL   Urinalysis, Microscopic Only - Urine, Clean Catch    Collection Time: 05/15/25 12:39 AM    Specimen: Urine, Clean Catch   Result Value Ref Range    RBC, UA Too Numerous to Count (A) None Seen, 0-2  "/HPF    WBC, UA 3-5 (A) None Seen, 0-2 /HPF    Bacteria, UA None Seen None Seen, Trace /HPF    Squamous Epithelial Cells, UA 0-2 None Seen, 0-2 /HPF    Hyaline Casts, UA 0-6 0 - 6 /LPF    Methodology Automated Microscopy      Note: In addition to lab results from this visit, the labs listed above may include labs taken at another facility or during a different encounter within the last 24 hours. Please correlate lab times with ED admission and discharge times for further clarification of the services performed during this visit.    CT Abdomen Pelvis Without Contrast   Final Result   Impression:   1.Stable 4 mm distal right ureteral stone with a standing column of excreted contrast from the right kidney. There is no significant hydronephrosis.   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                           Electronically Signed: Sandeep Camp MD     5/15/2025 1:02 AM EDT     Workstation ID: [id number]        Vitals:    05/15/25 0240 05/15/25 0300 05/15/25 0315 05/15/25 0500   BP:  147/93 130/79    BP Location:   Right arm    Patient Position:   Lying    Pulse: 67 62 77 60   Resp:   18    Temp:   98 °F (36.7 °C)    TempSrc:   Oral    SpO2: 96% 98% 96% 97%   Weight:   106 kg (232 lb 9.6 oz)    Height:   155.4 cm (61.2\")      Medications   Sodium Chloride (PF) 0.9 % 10 mL (has no administration in time range)   sodium chloride 0.9 % flush 10 mL (has no administration in time range)   sodium chloride 0.9 % flush 10 mL (10 mL Intravenous Given 5/15/25 0253)   sodium chloride 0.9 % flush 10 mL (has no administration in time range)   sodium chloride 0.9 % infusion 40 mL (has no administration in time range)   acetaminophen (TYLENOL) tablet 650 mg (has no administration in time range)   aluminum-magnesium hydroxide-simethicone (MAALOX MAX) 400-400-40 MG/5ML suspension 15 mL (has no administration in time range)   sennosides-docusate (PERICOLACE) 8.6-50 MG per tablet 2 tablet (has no " administration in time range)     And   polyethylene glycol (MIRALAX) packet 17 g (has no administration in time range)     And   bisacodyl (DULCOLAX) EC tablet 5 mg (has no administration in time range)     And   bisacodyl (DULCOLAX) suppository 10 mg (has no administration in time range)   Potassium Replacement - Follow Nurse / BPA Driven Protocol (has no administration in time range)   Magnesium Standard Dose Replacement - Follow Nurse / BPA Driven Protocol (has no administration in time range)   Phosphorus Replacement - Follow Nurse / BPA Driven Protocol (has no administration in time range)   Calcium Replacement - Follow Nurse / BPA Driven Protocol (has no administration in time range)   ondansetron (ZOFRAN) injection 4 mg (has no administration in time range)   nitroglycerin (NITROSTAT) SL tablet 0.4 mg (has no administration in time range)   lactated ringers infusion (125 mL/hr Intravenous New Bag 5/15/25 0332)   melatonin tablet 5 mg (has no administration in time range)   HYDROmorphone (DILAUDID) injection 0.5 mg (has no administration in time range)   HYDROmorphone (DILAUDID) injection 0.25 mg (has no administration in time range)   sodium chloride 0.9 % bolus 1,000 mL (0 mL Intravenous Stopped 5/15/25 0130)   Morphine sulfate (PF) injection 4 mg (4 mg Intravenous Given 5/14/25 2341)   ondansetron (ZOFRAN) injection 4 mg (4 mg Intravenous Given 5/14/25 2341)   tamsulosin (FLOMAX) 24 hr capsule 0.4 mg (0.4 mg Oral Given 5/14/25 2341)   HYDROmorphone (DILAUDID) injection 0.5 mg (0.5 mg Intravenous Given 5/15/25 0128)   ketorolac (TORADOL) injection 15 mg (15 mg Intravenous Given 5/15/25 0253)     ECG/EMG Results (last 24 hours)       ** No results found for the last 24 hours. **          No orders to display                     Medical Decision Making  Problems Addressed:  Acute renal insufficiency: complicated acute illness or injury  Deficient knowledge of hypertension: complicated acute illness or  injury  History of hyperlipidemia: complicated acute illness or injury  History of hypertension: complicated acute illness or injury  Intractable nausea and vomiting: complicated acute illness or injury  Intractable pain: complicated acute illness or injury  Pulmonary nodules: complicated acute illness or injury  Renal colic on right side: complicated acute illness or injury  Right ureteral stone: complicated acute illness or injury    Amount and/or Complexity of Data Reviewed  Labs: ordered.  Radiology: ordered.    Risk  Prescription drug management.  Decision regarding hospitalization.        Final diagnoses:   Right ureteral stone   Renal colic on right side   Intractable nausea and vomiting   Intractable pain   Acute renal insufficiency   Pulmonary nodules   History of hypertension   History of hyperlipidemia       ED Disposition  ED Disposition       ED Disposition   Decision to Admit    Condition   --    Comment   Level of Care: Telemetry [5]   Diagnosis: Right nephrolithiasis [[id number]]   Admitting Physician: KATIE CALABRESE [[id number]]   Attending Physician: KATIE CALABRESE [[id number]]                 No follow-up provider specified.       Medication List      No changes were made to your prescriptions during this visit.            Jesika Hammer PA-C  05/15/25 0514       Jesika Hammer PA-C  05/15/25 0537

## 2025-06-18 DIAGNOSIS — F90.0 ATTENTION DEFICIT HYPERACTIVITY DISORDER (ADHD), PREDOMINANTLY INATTENTIVE TYPE: ICD-10-CM

## 2025-06-18 RX ORDER — DEXTROAMPHETAMINE SACCHARATE, AMPHETAMINE ASPARTATE MONOHYDRATE, DEXTROAMPHETAMINE SULFATE AND AMPHETAMINE SULFATE 2.5; 2.5; 2.5; 2.5 MG/1; MG/1; MG/1; MG/1
10 CAPSULE, EXTENDED RELEASE ORAL DAILY
Qty: 30 CAPSULE | Refills: 0 | Status: SHIPPED | OUTPATIENT
Start: 2025-06-18 | End: 2025-07-18

## 2025-06-18 NOTE — TELEPHONE ENCOUNTER
Medication:   Requested Prescriptions     Pending Prescriptions Disp Refills    amphetamine-dextroamphetamine (ADDERALL XR) 10 MG extended release capsule 30 capsule 0     Sig: Take 1 capsule by mouth daily for 30 days. Max Daily Amount: 10 mg        Last Filled:  4/28/25    Patient Phone Number: [phone] (home)     Last appt: 4/28/2025 Return in about 6 months (around 10/28/2025) for ADD/ADHD.  Next appt: Visit date not found    Last OARRS:        No data to display

## 2025-07-17 DIAGNOSIS — F90.0 ATTENTION DEFICIT HYPERACTIVITY DISORDER (ADHD), PREDOMINANTLY INATTENTIVE TYPE: ICD-10-CM

## 2025-07-17 RX ORDER — DEXTROAMPHETAMINE SACCHARATE, AMPHETAMINE ASPARTATE MONOHYDRATE, DEXTROAMPHETAMINE SULFATE AND AMPHETAMINE SULFATE 2.5; 2.5; 2.5; 2.5 MG/1; MG/1; MG/1; MG/1
10 CAPSULE, EXTENDED RELEASE ORAL DAILY
Qty: 30 CAPSULE | Refills: 0 | Status: SHIPPED | OUTPATIENT
Start: 2025-07-17 | End: 2025-08-16

## 2025-07-17 NOTE — TELEPHONE ENCOUNTER
Medication:   Requested Prescriptions     Pending Prescriptions Disp Refills    amphetamine-dextroamphetamine (ADDERALL XR) 10 MG extended release capsule 30 capsule 0     Sig: Take 1 capsule by mouth daily for 30 days. Max Daily Amount: 10 mg        Last Filled:  06/18/25    Patient Phone Number: [phone] (home)     Last appt: 4/28/2025 Return in about 6 months (around 10/28/2025) for ADD/ADHD.   Next appt: Visit date not found    Last OARRS:        No data to display

## 2025-08-20 DIAGNOSIS — F90.0 ATTENTION DEFICIT HYPERACTIVITY DISORDER (ADHD), PREDOMINANTLY INATTENTIVE TYPE: ICD-10-CM

## 2025-08-20 RX ORDER — DEXTROAMPHETAMINE SACCHARATE, AMPHETAMINE ASPARTATE MONOHYDRATE, DEXTROAMPHETAMINE SULFATE AND AMPHETAMINE SULFATE 2.5; 2.5; 2.5; 2.5 MG/1; MG/1; MG/1; MG/1
10 CAPSULE, EXTENDED RELEASE ORAL DAILY
Qty: 30 CAPSULE | Refills: 0 | Status: SHIPPED | OUTPATIENT
Start: 2025-08-20 | End: 2025-09-19